# Patient Record
Sex: MALE | Employment: OTHER | URBAN - METROPOLITAN AREA
[De-identification: names, ages, dates, MRNs, and addresses within clinical notes are randomized per-mention and may not be internally consistent; named-entity substitution may affect disease eponyms.]

---

## 2018-12-04 ENCOUNTER — HOSPITAL ENCOUNTER (OUTPATIENT)
Dept: VASCULAR SURGERY | Age: 78
Discharge: HOME | End: 2018-12-04
Payer: MEDICARE

## 2018-12-04 ENCOUNTER — HOSPITAL ENCOUNTER (OUTPATIENT)
Dept: WOUND CARE | Age: 78
Discharge: HOME | End: 2018-12-04
Payer: MEDICARE

## 2018-12-04 VITALS
SYSTOLIC BLOOD PRESSURE: 141 MMHG | RESPIRATION RATE: 18 BRPM | OXYGEN SATURATION: 99 % | DIASTOLIC BLOOD PRESSURE: 67 MMHG | HEART RATE: 103 BPM | TEMPERATURE: 98.3 F

## 2018-12-04 DIAGNOSIS — I87.2 VENOUS INSUFFICIENCY (CHRONIC) (PERIPHERAL): ICD-10-CM

## 2018-12-04 PROBLEM — L30.4 INTERTRIGO: Status: ACTIVE | Noted: 2018-12-04

## 2018-12-04 PROBLEM — L89.42 PRESSURE ULCER OF CONTIGUOUS REGION INVOLVING BACK AND RIGHT BUTTOCK, STAGE 2 (HCC): Status: ACTIVE | Noted: 2018-12-04

## 2018-12-04 PROBLEM — I89.0 LYMPHEDEMA: Status: ACTIVE | Noted: 2018-12-04

## 2018-12-04 PROCEDURE — 29580 STRAPPING UNNA BOOT: CPT

## 2018-12-04 PROCEDURE — 93922 UPR/L XTREMITY ART 2 LEVELS: CPT

## 2018-12-04 RX ORDER — NYSTATIN 100000 [USP'U]/G
POWDER TOPICAL 4 TIMES DAILY
Qty: 60 G | Refills: 2 | Status: SHIPPED | OUTPATIENT
Start: 2018-12-04 | End: 2019-04-08

## 2018-12-04 NOTE — WOUND CARE
12/04/18 1545 Wound Buttocks Right;Medial  
Date First Assessed/Time First Assessed: 12/04/18 1541   POA: Yes  Wound Type: Pressure injury  Location: Buttocks  Orientation: Right;Medial  
DRESSING STATUS Removed Pressure Injury Stage 2 Wound Length (cm) 2 cm Wound Width (cm) 1.8 cm Wound Depth (cm) 0.3 Wound Surface area (cm^2) 3.6 cm^2 Condition of Base Pink; White Condition of Edges Rolled/curled;Calloused Tissue Type Percent Pink 50 Tissue Type Percent Red 40 Tissue Type Percent Other (comment) 10 
(whie) Drainage Amount  Scant Drainage Color Serosanguinous Wound Odor Mild Periwound Skin Condition Erythema, blanchable Cleansing and Cleansing Agents  Dermal wound cleanser (vashe) Dressing Type Applied (adilene,foam offload,mepilex border) Procedure Tolerated Well Wound Leg Lower Right; Lower Date First Assessed/Time First Assessed: 12/04/18 1542   POA: Yes  Wound Type: Moisture Assoc. Skin Damage  Location: Leg Lower  Orientation: Right; Lower DRESSING STATUS Removed DRESSING TYPE Adhesive wound dressing (Band-Aid) Non-Pressure Injury Partial thickness (epider/derm) Wound Length (cm) 1 cm Wound Width (cm) 1 cm Wound Depth (cm) 0.1 Wound Surface area (cm^2) 1 cm^2 Condition of Base Pink Condition of Edges Open Tissue Type Pink Drainage Amount  Small Drainage Color Serous Wound Odor Mild Periwound Skin Condition Erythema, blanchable (dry and scaley) Cleansing and Cleansing Agents  Dermal wound cleanser Dressing Type Applied Unna boot Procedure Tolerated Well

## 2018-12-04 NOTE — WOUND CARE
[facility] Chief Complaint: 
Sebastian Miranda is a 70 y.o.  male who presents with Buttocks R gluteal wound present since November 2018, bilateral lymphedema for more than 10 years. Referred by:  Dr. Jayson Souza HPI:   Patient presents to wound clinic with concerns of bilateral lymphedema that he reports is present for more than 10 years. He was admitted to [facility] from 11/14/2018 to 11/17/2018. He was found to be anemic, was also noted to have systolic murmur and found to have mod to severe aortic stenosis. He has pressure ulcer of right buttock since November of 2018. Wound caused by: chronic pressure Offloading wound: no Appetite: good Wound associated pain: mild Diabetic: yes Past Medical History:  
Diagnosis Date  Anemia  Aortic stenosis  DM (diabetes mellitus) ([facility] [address]  GI bleed  Lymphedema No past surgical history on file. No family history on file. Social History Tobacco Use  Smoking status: Not on file Substance Use Topics  Alcohol use: Not on file Prior to Admission medications Medication Sig Start Date End Date Taking? Authorizing Provider  
nystatin (MYCOSTATIN) powder Apply  to affected area four (4) times daily. 12/4/18  Yes Krissy Starks MD  
 
Allergies not on file Review of Systems Gen: No fever, chills, malaise, weight loss/gain. Heent: No headache, rhinorrhea, epistaxis, ear pain, hearing loss, sinus pain, neck pain/stiffness, sore throat. Heart: No chest pain, palpitations, LOPEZ, pnd, or orthopnea. Resp: No cough, hemoptysis, wheezing and shortness of breath. GI: No nausea, vomiting, diarrhea, constipation, melena or hematochezia. : No urinary obstruction, dysuria or hematuria. Derm: see above Musc/skeletal: no bone or joint complains. Vasc: bilateral lymphedema, chronic venous insufficiency Endo: No heat/cold intolerance, no polyuria,polydipsia or polyphagia. Neuro: No unilateral weakness, numbness, tingling. No seizures. Heme: No easy bruising or bleeding. Objective:  
 
Physical Exam:  
 
Visit Vitals /67 (BP 1 Location: Right arm, BP Patient Position: Sitting) Pulse (!) 103 Temp 98.3 °F (36.8 °C) Resp 18 SpO2 99% General: well developed, well nourished, pleasant , NAD. Hygiene good Psych: cooperative. Pleasant. No anxiety or depression. Normal mood and affect. Neuro: alert and oriented to person/place/situation. Otherwise nonfocal. 
Derm: Skin turgor for age, dry skin HEENT: Normocephalic, atraumatic. EOMI. Conjunctiva clear. No scleral icterus. Neck: Normal range of motion. No masses. Chest: Good air entry bilaterally. Respirations nonlabored Cardio[de-identified] Normal heart sounds,no rubs, murmurs or gallops Abdomen: Soft, nontender, nondistended, normoactive bowel sounds Lower extremities: color normal; temperature normal. Calves are supple, nontender. Capillary refill <3 sec Focussed Lower Extremity Exam: 
Vascular exam: 
Bilateral lower extremity: moderate  edema, foot ,  
DP pulse : Bilateral, 2+ 
PT pulse: Bilateral, 2+ Pressure ulcer right buttock stage 2. Intertrigo abdominal fold Wound Description: Wound Length:   
 
Wound Width :  
 
Wound Depth :  
 
Etiology: pressure Ulcer bed: Fibrotic slough Periwound: Edematous Exudate: Scant/small amount Serous exudate Data Review: No results found for this or any previous visit (from the past 24 hour(s)). Assessment:  
 
Patient Active Problem List  
Diagnosis Code  Lymphedema I89.0  Chronic venous insufficiency I87.2  Pressure ulcer of contiguous region involving back and right buttock, stage 2 L89.42  
 Intertrigo L30.4  
 
70 y.o. male with bilateral lymphedema, pressure ulcer right buttock stage 2. Needs : 
Keep pressure off 
Good local wound care Edema management Good Diabetic control Plan:  
Nystatin powder for intertrigo. In wound care clinic today: unna boots Cleanse wound with NS or soap and water or commercial wound cleanser Apply the following topically to wound bed: adilene Apply the following to elizabeth-wound: NA Apply the following dressings: Absorptive dressing For Home Care/Self Care: 
Cleanse wound with NS or soap and water or commercial wound cleanser Keep dressing dry and intact when bathing Apply the following to wound bed: adilene Apply the following to skin around wound: NA Apply the following dressings: Absorptive dressing Leave dressings in place until next visit Patient to return for wound care in:  14 Days Follow up with Nurse visit as recommended. PLEASE CONTACT OFFICE AS SOON AS POSSIBLE IF UNABLE TO MAKE THIS APPOINTMENT. Inspect your wounds, looking for signs of infection which may include the following:  Increase in redness  Red \"streaks\" from wound  Increase in swelling  Fever  Unusual odor  Change in the amount of wound drainage. Should you experience any significant changes in your wound(s) or have any questions regarding your home care instructions please contact the wound center or your home health company. If after regular business hours, please call your family doctor or local emergency room. Edema Control:   Elevate legs as much as possible. Avoid standing in one position for more than 10 minutes. Avoid setting with legs down. Do not cross legs while sitting. Off-Loading:     Frequent position changes. Do not cross legs while sitting. Shift weight every 20 minutes or more when sitting for prolonged periods of time. Signed By: Ave Schaeffer MD   
 December 4, 2018

## 2018-12-06 LAB
LEFT ABI: 1.09
LEFT ANTERIOR TIBIAL: 141 MMHG
LEFT ARM BP: 132 MMHG
LEFT POSTERIOR TIBIAL: 154 MMHG
LEFT TBI: 0.7
LEFT TOE PRESSURE: 99 MMHG
RIGHT ABI: 1.01
RIGHT ANTERIOR TIBIAL: 131 MMHG
RIGHT ARM BP: 141 MMHG
RIGHT POSTERIOR TIBIAL: 142 MMHG
RIGHT TBI: 0.72
RIGHT TOE PRESSURE: 101 MMHG

## 2018-12-18 ENCOUNTER — HOSPITAL ENCOUNTER (OUTPATIENT)
Dept: WOUND CARE | Age: 78
Discharge: HOME | End: 2018-12-18
Payer: MEDICARE

## 2018-12-18 VITALS
HEART RATE: 100 BPM | DIASTOLIC BLOOD PRESSURE: 61 MMHG | TEMPERATURE: 98 F | SYSTOLIC BLOOD PRESSURE: 141 MMHG | RESPIRATION RATE: 19 BRPM | OXYGEN SATURATION: 98 %

## 2018-12-18 PROCEDURE — 99214 OFFICE O/P EST MOD 30 MIN: CPT

## 2018-12-18 NOTE — WOUND CARE
12/18/18 1453   Wound Buttocks Right;Medial   Date First Assessed/Time First Assessed: 12/04/18 1541   POA: Yes  Wound Type: Pressure injury  Location: Buttocks  Orientation: Right;Medial   DRESSING STATUS Intact   DRESSING TYPE (mepilex border)   Pressure Injury Stage 2   Wound Length (cm) 0.5 cm   Wound Width (cm) 1 cm   Wound Depth (cm) 0.2   Wound Surface area (cm^2) 0.5 cm^2   Change in Wound Size % 86.11   Condition of Base Pink; White   Condition of Edges Calloused   Tissue Type Percent Pink 60   Tissue Type Percent Yellow 40   Drainage Amount  Small    Drainage Color Clear   Periwound Skin Condition Erythema, blanchable   Cleansing and Cleansing Agents  (foam wash, barrier wipes)   Dressing Type Applied (foam offload, mepilex border)   Procedure Tolerated Well   Wound Leg Lower Right; Lower   Date First Assessed/Time First Assessed: 12/04/18 1542   POA: Yes  Wound Type: Moisture Assoc. Skin Damage  Location: Leg Lower  Orientation: Right; Lower   DRESSING STATUS Intact   DRESSING TYPE Unna boot   Wound Length (cm) (wound clinically closed)   Epithelialization (%) 100   Tissue Type Pink   Drainage Amount  None

## 2018-12-19 NOTE — WOUND CARE
[address] [facility] ER  Follow up note. Chief Complaint:  Mckenzie Galarza is a 70 y.o.  male who presents for follow up of right buttock pressure ulcer and bilateral lymphedema. Right buttock pressure ulcer almost healed. Review of systems  General: No fevers or chills. Cardiovascular: No chest pain or pressure. No palpitations. Pulmonary: No shortness of breath. Gastrointestinal: No nausea, vomiting. Derm: See above                Physical Exam:     Visit Vitals  /61 (BP 1 Location: Left arm, BP Patient Position: Sitting)   Pulse 100   Temp 98 °F (36.7 °C)   Resp 19   SpO2 98%     General: well developed, well nourished, pleasant , NAD. Hygiene good  Psych: cooperative. Pleasant. No anxiety or depression. Normal mood and affect. Neuro: alert and oriented to person/place/situation. Otherwise nonfocal.  Derm: Skin turgor normal for age, dry skin  HEENT: Normocephalic, atraumatic. EOMI. Conjunctiva clear. No scleral icterus. Chest: Good air entry bilaterally. Respirations non labored  Cardio[de-identified] Normal heart sounds,no rubs, murmurs or gallops  Abdomen: Soft, nontender, nondistended, normoactive bowel sounds  Lower extremities: color normal; temperature normal. Calves are supple, nontender. Capillary refill <3 sec  Focussed Lower Extremity Exam:  Vascular exam:  Bilateral lower extremity: moderate  edema, foot ,   DP pulse : Bilateral, 2+  PT pulse: Bilateral, 2+  Pressure ulcer right buttock stage 2. Wound Description:   Wound Length: 0.5 cm, (wound clinically closed)    Wound Width :1 cm    Wound Depth :0.2        Data Review:   No results found for this or any previous visit (from the past 24 hour(s)).     Assessment:     Patient Active Problem List   Diagnosis Code    Lymphedema I89.0    Chronic venous insufficiency I87.2    Pressure ulcer of contiguous region involving back and right buttock, stage 2 L89.42    Intertrigo L30.4     70 y.o. male with pressure ulcer stage 2 now healed. Needs :    Edema management  l  Plan:     Tubigrip. Wear compression stockings at home  Follow up as needed. PLEASE CONTACT OFFICE AS SOON AS POSSIBLE IF UNABLE TO MAKE THIS APPOINTMENT. Inspect your wounds, looking for signs of infection which may include the following:  Increase in redness  Red \"streaks\" from wound  Increase in swelling  Fever  Unusual odor  Change in the amount of wound drainage. Should you experience any significant changes in your wound(s) or have any questions regarding your home care instructions please contact the wound center or your home health company. If after regular business hours, please call your family doctor or local emergency room. Edema Control:   Elevate legs as much as possible. Avoid standing in one position for more than 10 minutes. Avoid setting with legs down. Do not cross legs while sitting. Off-Loading:     Frequent position changes. Do not cross legs while sitting. Shift weight every 20 minutes or more when sitting for prolonged periods of time.     Signed By: Cintyha Morel MD     December 19, 2018

## 2019-01-04 ENCOUNTER — HOSPITAL ENCOUNTER (EMERGENCY)
Age: 79
Discharge: HOME | End: 2019-01-04
Attending: EMERGENCY MEDICINE
Payer: MEDICARE

## 2019-01-04 VITALS
SYSTOLIC BLOOD PRESSURE: 158 MMHG | DIASTOLIC BLOOD PRESSURE: 62 MMHG | TEMPERATURE: 98.4 F | HEART RATE: 94 BPM | WEIGHT: 180 LBS | RESPIRATION RATE: 18 BRPM | HEIGHT: 73 IN | BODY MASS INDEX: 23.86 KG/M2 | OXYGEN SATURATION: 99 %

## 2019-01-04 DIAGNOSIS — D64.9 ANEMIA, UNSPECIFIED TYPE: ICD-10-CM

## 2019-01-04 DIAGNOSIS — L03.116 CELLULITIS OF LEFT LOWER EXTREMITY: Primary | ICD-10-CM

## 2019-01-04 LAB
ALBUMIN SERPL-MCNC: 2.7 G/DL (ref 3.4–5)
ALBUMIN/GLOB SERPL: 0.6 {RATIO} (ref 0.8–1.7)
ALP SERPL-CCNC: 127 U/L (ref 45–117)
ALT SERPL-CCNC: 17 U/L (ref 16–61)
ANION GAP SERPL CALC-SCNC: 6 MMOL/L (ref 3–18)
APPEARANCE UR: CLEAR
AST SERPL-CCNC: 15 U/L (ref 15–37)
BACTERIA URNS QL MICRO: ABNORMAL /HPF
BASOPHILS # BLD: 0 K/UL (ref 0–0.1)
BASOPHILS NFR BLD: 0 % (ref 0–2)
BILIRUB SERPL-MCNC: 0.2 MG/DL (ref 0.2–1)
BILIRUB UR QL: NEGATIVE
BUN SERPL-MCNC: 22 MG/DL (ref 7–18)
BUN/CREAT SERPL: 20
CALCIUM SERPL-MCNC: 8.4 MG/DL (ref 8.5–10.1)
CHLORIDE SERPL-SCNC: 105 MMOL/L (ref 100–108)
CO2 SERPL-SCNC: 27 MMOL/L (ref 21–32)
COLOR UR: YELLOW
CREAT SERPL-MCNC: 1.11 MG/DL (ref 0.6–1.3)
DIFFERENTIAL METHOD BLD: ABNORMAL
EOSINOPHIL # BLD: 0.2 K/UL (ref 0–0.4)
EOSINOPHIL NFR BLD: 1 % (ref 0–5)
EPITH CASTS URNS QL MICRO: ABNORMAL /LPF (ref 0–5)
ERYTHROCYTE [DISTWIDTH] IN BLOOD BY AUTOMATED COUNT: 22.6 % (ref 11.6–14.5)
GLOBULIN SER CALC-MCNC: 4.4 G/DL (ref 2–4)
GLUCOSE SERPL-MCNC: 152 MG/DL (ref 74–99)
GLUCOSE UR STRIP.AUTO-MCNC: NEGATIVE MG/DL
HCT VFR BLD AUTO: 30.4 % (ref 36–48)
HGB BLD-MCNC: 9.5 G/DL (ref 13–16)
HGB UR QL STRIP: NEGATIVE
KETONES UR QL STRIP.AUTO: NEGATIVE MG/DL
LACTATE BLD-SCNC: 1.61 MMOL/L (ref 0.4–2)
LEUKOCYTE ESTERASE UR QL STRIP.AUTO: ABNORMAL
LYMPHOCYTES # BLD: 1.3 K/UL (ref 0.9–3.6)
LYMPHOCYTES NFR BLD: 12 % (ref 21–52)
MCH RBC QN AUTO: 22.6 PG (ref 24–34)
MCHC RBC AUTO-ENTMCNC: 31.3 G/DL (ref 31–37)
MCV RBC AUTO: 72.2 FL (ref 74–97)
MONOCYTES # BLD: 0.9 K/UL (ref 0.05–1.2)
MONOCYTES NFR BLD: 8 % (ref 3–10)
NEUTS SEG # BLD: 8.8 K/UL (ref 1.8–8)
NEUTS SEG NFR BLD: 79 % (ref 40–73)
NITRITE UR QL STRIP.AUTO: NEGATIVE
PH UR STRIP: 5 [PH] (ref 5–8)
PLATELET # BLD AUTO: 414 K/UL (ref 135–420)
PMV BLD AUTO: 8.9 FL (ref 9.2–11.8)
POTASSIUM SERPL-SCNC: 4.5 MMOL/L (ref 3.5–5.5)
PROT SERPL-MCNC: 7.1 G/DL (ref 6.4–8.2)
PROT UR STRIP-MCNC: 100 MG/DL
RBC # BLD AUTO: 4.21 M/UL (ref 4.7–5.5)
RBC #/AREA URNS HPF: ABNORMAL /HPF (ref 0–5)
SODIUM SERPL-SCNC: 138 MMOL/L (ref 136–145)
SP GR UR REFRACTOMETRY: 1.02 (ref 1–1.03)
UROBILINOGEN UR QL STRIP.AUTO: 1 EU/DL (ref 0.2–1)
WBC # BLD AUTO: 11.1 K/UL (ref 4.6–13.2)
WBC URNS QL MICRO: ABNORMAL /HPF (ref 0–5)

## 2019-01-04 PROCEDURE — 74011250636 HC RX REV CODE- 250/636: Performed by: NURSE PRACTITIONER

## 2019-01-04 PROCEDURE — 81001 URINALYSIS AUTO W/SCOPE: CPT

## 2019-01-04 PROCEDURE — 87070 CULTURE OTHR SPECIMN AEROBIC: CPT

## 2019-01-04 PROCEDURE — 87077 CULTURE AEROBIC IDENTIFY: CPT

## 2019-01-04 PROCEDURE — 96361 HYDRATE IV INFUSION ADD-ON: CPT

## 2019-01-04 PROCEDURE — 83605 ASSAY OF LACTIC ACID: CPT

## 2019-01-04 PROCEDURE — 87040 BLOOD CULTURE FOR BACTERIA: CPT

## 2019-01-04 PROCEDURE — 85025 COMPLETE CBC W/AUTO DIFF WBC: CPT

## 2019-01-04 PROCEDURE — 80053 COMPREHEN METABOLIC PANEL: CPT

## 2019-01-04 PROCEDURE — 96365 THER/PROPH/DIAG IV INF INIT: CPT

## 2019-01-04 PROCEDURE — 99283 EMERGENCY DEPT VISIT LOW MDM: CPT

## 2019-01-04 PROCEDURE — 87186 SC STD MICRODIL/AGAR DIL: CPT

## 2019-01-04 RX ORDER — CLINDAMYCIN PHOSPHATE 900 MG/50ML
900 INJECTION, SOLUTION INTRAVENOUS
Status: COMPLETED | OUTPATIENT
Start: 2019-01-04 | End: 2019-01-04

## 2019-01-04 RX ORDER — CLINDAMYCIN HYDROCHLORIDE 300 MG/1
300 CAPSULE ORAL 4 TIMES DAILY
Qty: 28 CAP | Refills: 0 | Status: SHIPPED | OUTPATIENT
Start: 2019-01-04 | End: 2019-01-11

## 2019-01-04 RX ORDER — SODIUM CHLORIDE 9 MG/ML
500 INJECTION, SOLUTION INTRAVENOUS ONCE
Status: COMPLETED | OUTPATIENT
Start: 2019-01-04 | End: 2019-01-04

## 2019-01-04 RX ADMIN — SODIUM CHLORIDE 500 ML: 900 INJECTION, SOLUTION INTRAVENOUS at 14:38

## 2019-01-04 RX ADMIN — CLINDAMYCIN PHOSPHATE 900 MG: 900 INJECTION, SOLUTION INTRAVENOUS at 14:38

## 2019-01-04 NOTE — ED TRIAGE NOTES
Here for dressing change to left lower leg, states he went to THE [facility] wound care clinic but they were too busy to see him today. Drainage and odor noticed from left lower leg.

## 2019-01-04 NOTE — DISCHARGE INSTRUCTIONS
Follow up as directed  Return to the ED for increased redness, swelling, drainage, fever or worsening of symptoms  Take medications as directed  Evleyn Gonzales were found to be anemic, you need to have your blood counts checked by your PCP     Patient Education        Cellulitis: Care Instructions  Your Care Instructions    Cellulitis is a skin infection caused by bacteria, most often strep or staph. It often occurs after a break in the skin from a scrape, cut, bite, or puncture, or after a rash. Cellulitis may be treated without doing tests to find out what caused it. But your doctor may do tests, if needed, to look for a specific bacteria, like methicillin-resistant Staphylococcus aureus (MRSA). The doctor has checked you carefully, but problems can develop later. If you notice any problems or new symptoms, get medical treatment right away. Follow-up care is a key part of your treatment and safety. Be sure to make and go to all appointments, and call your doctor if you are having problems. It's also a good idea to know your test results and keep a list of the medicines you take. How can you care for yourself at home? · Take your antibiotics as directed. Do not stop taking them just because you feel better. You need to take the full course of antibiotics. · Prop up the infected area on pillows to reduce pain and swelling. Try to keep the area above the level of your heart as often as you can. · If your doctor told you how to care for your wound, follow your doctor's instructions. If you did not get instructions, follow this general advice:  ? Wash the wound with clean water 2 times a day. Don't use hydrogen peroxide or alcohol, which can slow healing. ? You may cover the wound with a thin layer of petroleum jelly, such as Vaseline, and a nonstick bandage. ? Apply more petroleum jelly and replace the bandage as needed. · Be safe with medicines. Take pain medicines exactly as directed.   ? If the doctor gave you a prescription medicine for pain, take it as prescribed. ? If you are not taking a prescription pain medicine, ask your doctor if you can take an over-the-counter medicine. To prevent cellulitis in the future  · Try to prevent cuts, scrapes, or other injuries to your skin. Cellulitis most often occurs where there is a break in the skin. · If you get a scrape, cut, mild burn, or bite, wash the wound with clean water as soon as you can to help avoid infection. Don't use hydrogen peroxide or alcohol, which can slow healing. · If you have swelling in your legs (edema), support stockings and good skin care may help prevent leg sores and cellulitis. · Take care of your feet, especially if you have diabetes or other conditions that increase the risk of infection. Wear shoes and socks. Do not go barefoot. If you have athlete's foot or other skin problems on your feet, talk to your doctor about how to treat them. When should you call for help? Call your doctor now or seek immediate medical care if:    · You have signs that your infection is getting worse, such as:  ? Increased pain, swelling, warmth, or redness. ? Red streaks leading from the area. ? Pus draining from the area. ? A fever.     · You get a rash.    Watch closely for changes in your health, and be sure to contact your doctor if:    · You do not get better as expected. Where can you learn more? Go to http://miguel-sugey.info/. Charlie Bowers in the search box to learn more about \"Cellulitis: Care Instructions. \"  Current as of: April 18, 2018  Content Version: 11.8  © 5832-7545 [employer]. Care instructions adapted under license by [employer] (which disclaims liability or warranty for this information). If you have questions about a medical condition or this instruction, always ask your healthcare professional. [address] any warranty or liability for your use of this information.

## 2019-01-04 NOTE — ED PROVIDER NOTES
EMERGENCY DEPARTMENT HISTORY AND PHYSICAL EXAM 
 
Date: 1/4/2019 Patient Name: Martir Pro History of Presenting Illness Chief Complaint Patient presents with  Wound Check History Provided By: Patient Chief Complaint: leg swelling with weeping wounds Duration: 2 Days Timing:  Worsening Location: bilateral lower legs Severity: Severe Associated Symptoms: denies any other associated signs or symptoms Additional History (Context):  
 
2:07 PM 
 
Denis Merida III is a 70 y.o. male with pertinent PMHx of DM, anemia, and lymphedema presenting ambulatory to the ED c/o worsening severe bilateral lower leg swelling with weeping wounds x 2 days. Pt states that he has been treated for similar issues in the past with Wound Care. Last PCP visit was ~1 month ago. Pt specifically denies any leg pain, numbness, fever/chills or N/V/D. 
 
PCP: Tion Oliveira MD 
Pt does not smoke tobacco, drink ETOH or do illicit drugs. There are no other complaints, changes, or physical findings at this time. Current Outpatient Medications Medication Sig Dispense Refill  clindamycin (CLEOCIN) 300 mg capsule Take 1 Cap by mouth four (4) times daily for 7 days. 28 Cap 0  
 nystatin (MYCOSTATIN) powder Apply  to affected area four (4) times daily. 60 g 2 Past History Past Medical History: 
Past Medical History:  
Diagnosis Date  Anemia  Aortic stenosis  At risk for falls  DM (diabetes mellitus) ([address]  GI bleed  Lymphedema Past Surgical History: No past surgical history on file. Family History: No family history on file. Social History: 
Social History Tobacco Use  Smoking status: Not on file Substance Use Topics  Alcohol use: Not on file  Drug use: Not on file Allergies: Allergies Allergen Reactions  Pcn [Penicillins] Hives, Shortness of Breath and Itching Review of Systems Review of Systems Constitutional: Negative for chills and fever. Cardiovascular: Positive for leg swelling (bilateral). Gastrointestinal: Negative for abdominal pain, diarrhea, nausea and vomiting. Musculoskeletal: Negative for myalgias. Skin: Positive for wound (weeping bilateral lower legs). Neurological: Negative for numbness. All other systems reviewed and are negative. Physical Exam  
 
Vitals:  
 01/04/19 1244 01/04/19 1539 01/04/19 1612 BP: 154/56 158/62 Pulse: (!) 103 94 Resp: 18 18 Temp: 99.1 °F (37.3 °C)  98.4 °F (36.9 °C) SpO2: 99% 99% Weight: 81.6 kg (180 lb) Height: 6' 1\" (1.854 m) Physical Exam  
Constitutional: He is oriented to person, place, and time. He appears well-developed and well-nourished. chronically ill appearing, NAD HENT:  
Head: Normocephalic and atraumatic. Eyes: Conjunctivae are normal.  
Neck: Normal range of motion. Neck supple. Cardiovascular: Regular rhythm. Murmur heard. Mild Tachycardia Pulmonary/Chest: Effort normal.  
Decreased b/l bases Abdominal: Soft. Bowel sounds are normal. There is no tenderness. There is no rebound and no guarding. Musculoskeletal: Normal range of motion. Legs: 
+2 b/l lower extremity edema, positive pedal pulses b/l, flaking of the lower extremities b/l, erythema and moderate serosanguinous fluid from left lower extremity, warmth noted, no TTP Neurological: He is alert and oriented to person, place, and time. Skin: Skin is warm and dry. Nursing note and vitals reviewed. Diagnostic Study Results Labs - Recent Results (from the past 12 hour(s)) CBC WITH AUTOMATED DIFF Collection Time: 01/04/19  1:55 PM  
Result Value Ref Range WBC 11.1 4.6 - 13.2 K/uL  
 RBC 4.21 (L) 4.70 - 5.50 M/uL HGB 9.5 (L) 13.0 - 16.0 g/dL HCT 30.4 (L) 36.0 - 48.0 % MCV 72.2 (L) 74.0 - 97.0 FL  
 MCH 22.6 (L) 24.0 - 34.0 PG  
 MCHC 31.3 31.0 - 37.0 g/dL RDW 22.6 (H) 11.6 - 14.5 % PLATELET 968 841 - 381 K/uL MPV 8.9 (L) 9.2 - 11.8 FL  
 NEUTROPHILS 79 (H) 40 - 73 % LYMPHOCYTES 12 (L) 21 - 52 % MONOCYTES 8 3 - 10 % EOSINOPHILS 1 0 - 5 % BASOPHILS 0 0 - 2 %  
 ABS. NEUTROPHILS 8.8 (H) 1.8 - 8.0 K/UL  
 ABS. LYMPHOCYTES 1.3 0.9 - 3.6 K/UL  
 ABS. MONOCYTES 0.9 0.05 - 1.2 K/UL  
 ABS. EOSINOPHILS 0.2 0.0 - 0.4 K/UL  
 ABS. BASOPHILS 0.0 0.0 - 0.1 K/UL  
 DF AUTOMATED METABOLIC PANEL, COMPREHENSIVE Collection Time: 01/04/19  1:55 PM  
Result Value Ref Range Sodium 138 136 - 145 mmol/L Potassium 4.5 3.5 - 5.5 mmol/L Chloride 105 100 - 108 mmol/L  
 CO2 27 21 - 32 mmol/L Anion gap 6 3.0 - 18 mmol/L Glucose 152 (H) 74 - 99 mg/dL BUN 22 (H) 7.0 - 18 MG/DL Creatinine 1.11 0.6 - 1.3 MG/DL  
 BUN/Creatinine ratio 20 GFR est AA >60 >60 ml/min/1.73m2 GFR est non-AA >60 >60 ml/min/1.73m2 Calcium 8.4 (L) 8.5 - 10.1 MG/DL Bilirubin, total 0.2 0.2 - 1.0 MG/DL  
 ALT (SGPT) 17 16 - 61 U/L  
 AST (SGOT) 15 15 - 37 U/L Alk. phosphatase 127 (H) 45 - 117 U/L Protein, total 7.1 6.4 - 8.2 g/dL Albumin 2.7 (L) 3.4 - 5.0 g/dL Globulin 4.4 (H) 2.0 - 4.0 g/dL A-G Ratio 0.6 (L) 0.8 - 1.7 POC LACTIC ACID Collection Time: 01/04/19  2:01 PM  
Result Value Ref Range Lactic Acid (POC) 1.61 0.40 - 2.00 mmol/L  
URINALYSIS W/ RFLX MICROSCOPIC Collection Time: 01/04/19  3:45 PM  
Result Value Ref Range Color YELLOW Appearance CLEAR Specific gravity 1.016 1.005 - 1.030    
 pH (UA) 5.0 5.0 - 8.0 Protein 100 (A) NEG mg/dL Glucose NEGATIVE  NEG mg/dL Ketone NEGATIVE  NEG mg/dL Bilirubin NEGATIVE  NEG Blood NEGATIVE  NEG Urobilinogen 1.0 0.2 - 1.0 EU/dL Nitrites NEGATIVE  NEG Leukocyte Esterase SMALL (A) NEG URINE MICROSCOPIC ONLY Collection Time: 01/04/19  3:45 PM  
Result Value Ref Range WBC 30 to 40 0 - 5 /hpf  
 RBC 2 to 3 0 - 5 /hpf Epithelial cells 2+ 0 - 5 /lpf Bacteria 2+ (A) NEG /hpf Radiologic Studies - No orders to display Medical Decision Making I am the first provider for this patient. I reviewed the vital signs, available nursing notes, past medical history, past surgical history, family history and social history. Vital Signs-Reviewed the patient's vital signs. Pulse Oximetry Analysis - 99% on RA Records Reviewed: Nursing Notes Provider Notes (Medical Decision Making): Patient presents to the ED for leg weeping and being unable to be seen at the wound clinic. Leg exam is weeping and consistent with cellulitis. Labs shows no signs of severe infection. His legs were dressed by wound clinic. He was evaluated by attending who believes he is appropriate for outpatient treatment. Will place on Clindamycin to follow up with wound clinic on Monday. He understands reasons to return and is offering no questions or concerns PROCEDURES: 
Procedures MEDICATIONS GIVEN IN THE ED: 
Medications  
0.9% sodium chloride infusion 500 mL (0 mL IntraVENous IV Completed 1/4/19 1539) clindamycin (CLEOCIN) 900mg NS 50mL IVPB (premix) (900 mg IntraVENous Given 1/4/19 1438) ED COURSE:  
2:07 PM  
Initial assessment performed. FACE-TO-FACE PROGRESS NOTE: 
4:28 PM 
NP asked to see patient who presents with venostasis with questionable secondary infection. My exam shows no fever and no white count Pt is standing at the door ready to go home and he says he is fine Imp/plan: I agree with discharge and PO of abx. I have personally seen and examined the patient, reviewed the EZEKIEL's note and agree with findings and plan. Written by Cozette Dance, ED Scribe, as dictated by Sara Oliveira MD. Diagnosis and Disposition DISCHARGE NOTE: 
4:37 PM 
The patient is ready for discharge.  The patient's signs, symptoms, diagnosis, and discharge instructions have been discussed and the patient and/or family has conveyed their understanding. The patient and/or family is to follow up as recommended or return to the ER should their symptoms worsen. Plan has been discussed and the patient and/or family is in agreement. Written by Maritza Cervantes ED Scribe, as dictated by Montse Martini FNP-BC. CLINICAL IMPRESSION: 
1. Cellulitis of left lower extremity 2. Anemia, unspecified type PLAN: 
1. D/C Home 2. Discharge Medication List as of 1/4/2019  4:37 PM  
  
START taking these medications Details  
clindamycin (CLEOCIN) 300 mg capsule Take 1 Cap by mouth four (4) times daily for 7 days. , Normal, Disp-28 Cap, R-0  
  
  
CONTINUE these medications which have NOT CHANGED Details  
nystatin (MYCOSTATIN) powder Apply  to affected area four (4) times daily. , Normal, Disp-60 g, R-2 3. Follow-up Information Follow up With Specialties Details Why Contact Info Additional Information [facility] OP WOUND CARE Wound Care Schedule an appointment as soon as possible for a visit for wound care [address] [phone] Patients scheduled for OP Wound Care visits should enter the [facility], 2nd floor, [address] for check in. Desiree Dash MD Family Practice Schedule an appointment as soon as possible for a visit for primary care follow up [address] [place] [address] 
[phone] [facility] EMERGENCY DEPT Emergency Medicine  As needed, If symptoms worsen [address] [phone]   
  
 
_______________________________ Attestations: This note is prepared by Jacqueline Ray, acting as Scribe for Montse Martini FNP-BC. Montse Martini FNP-BC:  The scribe's documentation has been prepared under my direction and personally reviewed by me in its entirety. I confirm that the note above accurately reflects all work, treatment, procedures, and medical decision making performed by me. ATTESTATIONS: 
This note is prepared by Lexa Anglin, acting as Scribe for [employer], MD. [employer], MD: The scribe's documentation has been prepared under my direction and personally reviewed by me in its entirety. I confirm that the note above accurately reflects all work, treatment, procedures, and medical decision making performed by me.   
_______________________________

## 2019-01-07 ENCOUNTER — HOSPITAL ENCOUNTER (OUTPATIENT)
Dept: WOUND CARE | Age: 79
Discharge: HOME | End: 2019-01-07
Payer: MEDICARE

## 2019-01-07 VITALS
TEMPERATURE: 97.3 F | RESPIRATION RATE: 18 BRPM | HEART RATE: 67 BPM | SYSTOLIC BLOOD PRESSURE: 134 MMHG | DIASTOLIC BLOOD PRESSURE: 60 MMHG | OXYGEN SATURATION: 100 %

## 2019-01-07 LAB
BACTERIA SPEC CULT: ABNORMAL
BACTERIA SPEC CULT: ABNORMAL
GRAM STN SPEC: ABNORMAL
GRAM STN SPEC: ABNORMAL
SERVICE CMNT-IMP: ABNORMAL

## 2019-01-07 PROCEDURE — 29580 STRAPPING UNNA BOOT: CPT

## 2019-01-07 NOTE — WOUND CARE
01/07/19 1548 Wound Leg Lower Medial  
Date First Assessed/Time First Assessed: 01/07/19 1330   POA: Yes  Wound Type: Blister/bullae  Location: Leg Lower  Orientation: Medial  
DRESSING STATUS Clean, dry, and intact DRESSING TYPE (ace wraps, kerlex) Non-Pressure Injury Partial thickness (epider/derm) Wound Length (cm) 3 cm Wound Width (cm) 6.2 cm Wound Depth (cm) 0.1 Wound Surface area (cm^2) 18.6 cm^2 Condition of Base Pink Condition of Edges Open Tissue Type Percent Pink 100 Drainage Amount  Small Drainage Color Serous Wound Odor None Periwound Skin Condition Intact Cleansing and Cleansing Agents  (foam wash, vashe and barrier wipes) Dressing Type Applied (aquacel ag, exudry, soft roll and unna boot) Procedure Tolerated Well Wound Leg Right;Lateral  
Date First Assessed/Time First Assessed: 01/07/19 1330   POA: Yes  Wound Type: Blister/bullae  Location: Leg  Orientation: Right;Lateral  
DRESSING STATUS Clean, dry, and intact DRESSING TYPE (ace wrap and kerlex) Non-Pressure Injury Partial thickness (epider/derm) Wound Length (cm) (multiple open areas in an area that measures 5 x 4.2 x 0.1) Condition of Base Pink Condition of Edges Open Tissue Type Percent Pink 100 Drainage Amount  Small Drainage Color Serous Wound Odor None Cleansing and Cleansing Agents  (foam wash, vashe and barrier wipes) Dressing Type Applied (aquacel ag, exudry, soft roll, unna boot) Procedure Tolerated Well

## 2019-01-10 LAB
BACTERIA SPEC CULT: NORMAL
BACTERIA SPEC CULT: NORMAL
SERVICE CMNT-IMP: NORMAL
SERVICE CMNT-IMP: NORMAL

## 2019-01-15 ENCOUNTER — HOSPITAL ENCOUNTER (OUTPATIENT)
Dept: WOUND CARE | Age: 79
Discharge: HOME | End: 2019-01-15
Payer: MEDICARE

## 2019-01-15 VITALS
DIASTOLIC BLOOD PRESSURE: 75 MMHG | TEMPERATURE: 98.1 F | RESPIRATION RATE: 21 BRPM | OXYGEN SATURATION: 99 % | HEART RATE: 100 BPM | SYSTOLIC BLOOD PRESSURE: 155 MMHG

## 2019-01-15 PROBLEM — I83.029 VENOUS ULCER OF LEFT LEG (HCC): Status: ACTIVE | Noted: 2019-01-15

## 2019-01-15 PROBLEM — L97.929 VENOUS ULCER OF LEFT LEG (HCC): Status: ACTIVE | Noted: 2019-01-15

## 2019-01-15 PROCEDURE — 99215 OFFICE O/P EST HI 40 MIN: CPT

## 2019-01-15 NOTE — WOUND CARE
[facility] Follow up note. Chief Complaint: 
Jacob Obregon is a 70 y.o.  male who presents for follow up of follow up of right buttock pressure ulcer and bilateral lymphedema.  
  
Right buttock pressure ulcer healed. he has lymphedema for about 5 years. He has on and off venous ulcers. He has left leg venous ulcers. He reports his PCP started him on antibiotic daily and he is suppose to take it for long time. He is poor historian and does not remember why he is started on antibiotic and which antibiotic. Review of systems General: No fevers or chills. Cardiovascular: No chest pain or pressure. No palpitations. Pulmonary: No shortness of breath. Gastrointestinal: No nausea, vomiting. Derm: See above Physical Exam:  
 
Visit Vitals /75 (BP 1 Location: Left arm, BP Patient Position: Sitting) Pulse 100 Temp 98.1 °F (36.7 °C) Resp 21 SpO2 99% General: well developed, well nourished, pleasant , NAD. Hygiene good Psych: cooperative. Pleasant. No anxiety or depression. Normal mood and affect. Neuro: alert and oriented to person/place/situation. Otherwise nonfocal. 
Derm: Skin turgor normal for age, dry skin HEENT: Normocephalic, atraumatic. EOMI. Conjunctiva clear. No scleral icterus. Chest: Good air entry bilaterally. Respirations non labored Cardio[de-identified] Normal heart sounds,no rubs, murmurs or gallops Abdomen: Soft, nontender, nondistended, normoactive bowel sounds Lower extremities: color normal; temperature normal. Calves are supple, nontender. Focussed Lower Extremity Exam: 
Vascular exam: 
Bilateral lower extremity: moderate  edema, foot ,  
DP pulse : Bilateral, 2+ 
PT pulse: Bilateral, 2+ Venous ulcer left leg. And small ulcer right leg Wound Description: Wound Length: 2 cm Wound Width :5.5 cm Wound Depth :0.1 Etiology: venous stasis Ulcer bed: Mixed Granular/Fibrotic Periwound: Reddened Exudate: Scant/small amount Serosanguinous exudate Data Review: No results found for this or any previous visit (from the past 24 hour(s)). Assessment:  
 
Patient Active Problem List  
Diagnosis Code  Lymphedema I89.0  Chronic venous insufficiency I87.2  Pressure ulcer of contiguous region involving back and right buttock, stage 2 L89.42  
 Intertrigo L30.4  Venous ulcer of left leg ([facility]) I83.029, L97.929  
 
70 y.o. male with bilateral venous ulcer of left leg and small ulcer right leg Pressure ulcer sacral area healed. Needs : 
Serial debridement- debrided today- see note below Good local wound care Edema management Nutrition optimization Good Diabetic control Plan:  
Unna boots In wound care clinic today: 
Cleanse wound with NS or soap and water or commercial wound cleanser Apply the following topically to wound bed: adilene Apply the following to elizabeth-wound: NA Apply the following dressings: Absorptive dressing For Home Care/Self Care: 
Cleanse wound with NS or soap and water or commercial wound cleanser Keep dressing dry and intact when bathing Apply the following to wound bed: adilene Apply the following to skin around wound: NA Apply the following dressings: Absorptive dressing Patient to return for wound care in: 14  Days Follow up with Nurse visit as recommended. PLEASE CONTACT OFFICE AS SOON AS POSSIBLE IF UNABLE TO MAKE THIS APPOINTMENT. Inspect your wounds, looking for signs of infection which may include the following:  Increase in redness  Red \"streaks\" from wound  Increase in swelling  Fever  Unusual odor  Change in the amount of wound drainage. Should you experience any significant changes in your wound(s) or have any questions regarding your home care instructions please contact the wound center or your home health company. If after regular business hours, please call your family doctor or local emergency room. Edema Control:   Elevate legs as much as possible. Avoid standing in one position for more than 10 minutes. Avoid setting with legs down. Do not cross legs while sitting. Off-Loading:     Frequent position changes. Do not cross legs while sitting. Shift weight every 20 minutes or more when sitting for prolonged periods of time. Signed By: Houston Corcoran MD   
 January 15, 2019

## 2019-01-15 NOTE — WOUND CARE
01/15/19 1534 Wound Leg Lower Medial  
Date First Assessed/Time First Assessed: 01/07/19 1330   POA: Yes  Wound Type: Blister/bullae  Location: Leg Lower  Orientation: Medial  
DRESSING STATUS Clean, dry, and intact Non-Pressure Injury Full thickness (subcut/muscle) Wound Length (cm) 2 cm Wound Width (cm) 5.5 cm Wound Depth (cm) 0.1 Wound Surface area (cm^2) 11 cm^2 Change in Wound Size % 40.86 Condition of Base Pink Condition of Edges Open Tissue Type Percent Pink 100 Procedure Tolerated Well Wound Leg Lower Right; Lower Date First Assessed/Time First Assessed: 12/04/18 1542   POA: Yes  Wound Type: Moisture Assoc. Skin Damage  Location: Leg Lower  Orientation: Right; Lower DRESSING STATUS Clean, dry, and intact DRESSING TYPE Absorptive; Compression dressing Non-Pressure Injury Partial thickness (epider/derm) Wound Length (cm) 1 cm Wound Width (cm) 3 cm Wound Depth (cm) 0.1 Wound Surface area (cm^2) 3 cm^2 Change in Wound Size % -200 Condition of Edges Open Tissue Type Percent Pink (100) Drainage Amount  Scant Drainage Color Clear Periwound Skin Condition Erythema, blanchable Cleansing and Cleansing Agents  Dermal wound cleanser Dressing Type Applied Unna boot 
(coban) Procedure Tolerated Well Wound Leg Right;Lateral  
Date First Assessed/Time First Assessed: 01/07/19 1330   POA: Yes  Wound Type: Blister/bullae  Location: Leg  Orientation: Right;Lateral  
DRESSING STATUS Clean, dry, and intact DRESSING TYPE Unna boot Non-Pressure Injury Partial thickness (epider/derm) Condition of Base Pink Condition of Edges Open Tissue Type Percent Pink 100 Drainage Amount  Small Drainage Color Serous Wound Odor None Cleansing and Cleansing Agents  Dermal wound cleanser Dressing Type Applied Unna boot 
(coban, sorbion sachet) Procedure Tolerated Well

## 2019-01-22 ENCOUNTER — HOSPITAL ENCOUNTER (OUTPATIENT)
Dept: WOUND CARE | Age: 79
Discharge: HOME | End: 2019-01-22
Payer: MEDICARE

## 2019-01-22 VITALS
SYSTOLIC BLOOD PRESSURE: 166 MMHG | TEMPERATURE: 97.5 F | DIASTOLIC BLOOD PRESSURE: 76 MMHG | RESPIRATION RATE: 20 BRPM | HEART RATE: 107 BPM | OXYGEN SATURATION: 100 %

## 2019-01-22 PROCEDURE — 29580 STRAPPING UNNA BOOT: CPT

## 2019-01-28 NOTE — WOUND CARE
01/22/19 1505 Wound Leg Lower Medial;Left Date First Assessed/Time First Assessed: 01/07/19 1330   POA: Yes  Wound Type: Blister/bullae  Location: Leg Lower  Orientation: Medial;Left DRESSING STATUS Clean, dry, and intact Non-Pressure Injury Full thickness (subcut/muscle) Wound Length (cm) 0.3 cm Wound Width (cm) 0.4 cm Wound Depth (cm) 0.1 Wound Surface area (cm^2) 0.12 cm^2 Change in Wound Size % 99.35 Condition of Base Pink Condition of Edges Open Tissue Type Percent Pink 100 Drainage Amount  Scant Drainage Color Serous Cleansing and Cleansing Agents  (foam wash, vashe and barrier wipes) Dressing Type Applied Unna boot Procedure Tolerated Well

## 2019-01-29 ENCOUNTER — HOSPITAL ENCOUNTER (OUTPATIENT)
Dept: WOUND CARE | Age: 79
Discharge: HOME | End: 2019-01-29
Payer: MEDICARE

## 2019-01-29 VITALS
TEMPERATURE: 98.2 F | DIASTOLIC BLOOD PRESSURE: 67 MMHG | SYSTOLIC BLOOD PRESSURE: 142 MMHG | HEART RATE: 91 BPM | RESPIRATION RATE: 18 BRPM | OXYGEN SATURATION: 100 %

## 2019-01-29 PROCEDURE — 99214 OFFICE O/P EST MOD 30 MIN: CPT

## 2019-01-29 NOTE — WOUND CARE
01/29/19 1407 Wound Leg Lower Medial;Left Date First Assessed/Time First Assessed: 01/07/19 1330   POA: Yes  Wound Type: Blister/bullae  Location: Leg Lower  Orientation: Medial;Left Dressing Status  Clean, dry, and intact Dressing Type  Unna boot Wound Length (cm) (wound is clinically closed) Cleansing and Cleansing Agents  (foam wash and barrier wipes) Dressing Type Applied (farrow wrap applied) Procedure Tolerated Well

## 2019-01-31 NOTE — WOUND CARE
[facility] Follow up note. Chief Complaint: 
Eloise Parikh is a 70 y.o.  male who presents for follow up of follow up of right buttock pressure ulcer and bilateral lymphedema.  
  
Right buttock pressure ulcer healed. he has lymphedema for about 5 years. He has on and off venous ulcers. He has left leg venous ulcers. He reports his PCP started him on antibiotic daily and he is suppose to take it for long time. He is poor historian and does not remember why he is started on antibiotic and which antibiotic. Review of systems General: No fevers or chills. Cardiovascular: No chest pain or pressure. No palpitations. Pulmonary: No shortness of breath. Gastrointestinal: No nausea, vomiting. Derm: See above Physical Exam:  
 
Visit Vitals /67 (BP 1 Location: Left arm, BP Patient Position: Sitting) Pulse 91 Temp 98.2 °F (36.8 °C) Resp 18 SpO2 100% General: well developed, well nourished, pleasant , NAD. Hygiene good Psych: cooperative. Pleasant. No anxiety or depression. Normal mood and affect. Neuro: alert and oriented to person/place/situation. Otherwise nonfocal. 
Derm: Skin turgor normal for age, dry skin HEENT: Normocephalic, atraumatic. EOMI. Conjunctiva clear. No scleral icterus. Chest: Good air entry bilaterally. Respirations non labored Cardio[de-identified] Normal heart sounds,no rubs, murmurs or gallops Abdomen: Soft, nontender, nondistended, normoactive bowel sounds Lower extremities: color normal; temperature normal. Calves are supple, nontender. Focussed Lower Extremity Exam: 
Vascular exam: 
Bilateral lower extremity: moderate  edema, foot ,  
DP pulse : Bilateral, 2+ 
PT pulse: Bilateral, 2+ Venous ulcer left leg. And small ulcer right leg Wound Description: Wound Length: (wound is clinically closed) Wound Width :  
 
Wound Depth :  
 
Etiology: venous stasis Ulcer bed: Mixed Granular/Fibrotic Periwound: Reddened Exudate: Scant/small amount Serosanguinous exudate Data Review: No results found for this or any previous visit (from the past 24 hour(s)). Assessment:  
 
Patient Active Problem List  
Diagnosis Code  Lymphedema I89.0  Chronic venous insufficiency I87.2  Pressure ulcer of contiguous region involving back and right buttock, stage 2 L89.42  
 Intertrigo L30.4  Venous ulcer of left leg ([facility]) I83.029, L97.929  
 
70 y.o. male with bilateral venous ulcer of left leg and small ulcer right leg Pressure ulcer sacral area all healed. Needs : 
 
Edema management Nutrition optimization Good Diabetic control Plan:  
Compression stockings Follow up as needed Signed By: Lavell Chen MD   
 January 30, 2019

## 2019-03-06 ENCOUNTER — HOSPITAL ENCOUNTER (OUTPATIENT)
Dept: WOUND CARE | Age: 79
Discharge: HOME | End: 2019-03-06
Payer: MEDICARE

## 2019-03-06 VITALS
RESPIRATION RATE: 18 BRPM | SYSTOLIC BLOOD PRESSURE: 152 MMHG | HEART RATE: 98 BPM | DIASTOLIC BLOOD PRESSURE: 70 MMHG | OXYGEN SATURATION: 100 % | TEMPERATURE: 98.5 F

## 2019-03-06 PROBLEM — L89.42 PRESSURE ULCER OF CONTIGUOUS REGION INVOLVING BACK AND RIGHT BUTTOCK, STAGE 2 (HCC): Status: RESOLVED | Noted: 2018-12-04 | Resolved: 2019-03-06

## 2019-03-06 PROBLEM — L97.919 VENOUS ULCER OF RIGHT LEG (HCC): Status: ACTIVE | Noted: 2019-03-06

## 2019-03-06 PROBLEM — I83.019 VENOUS ULCER OF RIGHT LEG (HCC): Status: ACTIVE | Noted: 2019-03-06

## 2019-03-06 PROCEDURE — 29580 STRAPPING UNNA BOOT: CPT

## 2019-03-06 NOTE — DISCHARGE INSTRUCTIONS
Patient to return for wound care in:  14 Days   Follow up with Nurse visit as recommended. PLEASE CONTACT OFFICE AS SOON AS POSSIBLE IF UNABLE TO MAKE THIS APPOINTMENT. Inspect your wounds, looking for signs of infection which may include the following:  Increase in redness  Red \"streaks\" from wound  Increase in swelling  Fever  Unusual odor  Change in the amount of wound drainage. Should you experience any significant changes in your wound(s) or have any questions regarding your home care instructions please contact the wound center or your home health company. If after regular business hours, please call your family doctor or local emergency room. Edema Control:   Elevate legs as much as possible. Avoid standing in one position for more than 10 minutes. Avoid setting with legs down. Do not cross legs while sitting.     03/06/19 3357

## 2019-03-06 NOTE — WOUND CARE
03/06/19 1515   Wound Leg Lower Lateral;Right;Posterior   Date First Assessed/Time First Assessed: 03/06/19 1513   Wound Type: Venous  Location: Leg Lower  Orientation: Lateral;Right;Posterior   Dressing Status  Intact; Old drainage   Dressing Type  Compression Wrap/Venous Stasis  (Farrow wraps)   Wound Length (cm) 2 cm   Wound Width (cm) 3 cm   Wound Depth (cm) 0.1   Wound Surface area (cm^2) 6 cm^2   Condition of Base Granulation   Condition of Edges Open   Tissue Type Pink;Red   Tissue Type Percent Pink 80   Tissue Type Percent Red 20   Drainage Amount  Small    Drainage Color Serous   Wound Odor Musty;Strong   Cleansing and Cleansing Agents  Dermal wound cleanser; Soap and water   Dressing Type Applied Collagens/cell matrix; Unna boot  (coban, bacitracin, exudry, brendan,)

## 2019-03-06 NOTE — WOUND CARE
[address] [facility]  Follow up note. Chief Complaint:  Giovanni Aldridge is a 70 y.o.  male who presents with new open wound of his right leg      he has lymphedema for about 5 years. He has on and off venous ulcers. He was seen here previously for venous ulcer and it was healed after unna boots and compression stockings. He has not been compliant with wearing stockings and keeping his legs up. He also has very dry scaly skin and was advised to use moisturizing cream.          Review of systems  General: No fevers or chills. Cardiovascular: No chest pain or pressure. No palpitations. Pulmonary: No shortness of breath. Gastrointestinal: No nausea, vomiting. Derm: See above                Physical Exam:     Visit Vitals  /70 (BP 1 Location: Right arm, BP Patient Position: At rest;Sitting)   Pulse 98   Temp 98.5 °F (36.9 °C)   Resp 18   SpO2 100%     General: well developed, well nourished, pleasant , NAD. Hygiene good  Psych: cooperative. Pleasant. No anxiety or depression. Normal mood and affect. Neuro: alert and oriented to person/place/situation. Otherwise nonfocal.  Derm: Skin turgor normal for age, dry skin  HEENT: Normocephalic, atraumatic. EOMI. Conjunctiva clear. No scleral icterus. Chest: Good air entry bilaterally. Respirations non labored  Cardio[de-identified] Normal heart sounds,no rubs, murmurs or gallops  Abdomen: Soft, nontender, nondistended, normoactive bowel sounds  Lower extremities: color normal; temperature normal. Calves are supple, nontender. Focussed Lower Extremity Exam:  Vascular exam:  Bilateral lower extremity: moderate  edema, foot ,   DP pulse : Bilateral, 2+  PT pulse: Bilateral, 2+  Venous ulcer right leg.      Wound Description:   Wound Length: 2 cm    Wound Width :3 cm    Wound Depth :0.1    Etiology: venous stasis  Ulcer bed: Mixed Granular/Fibrotic    Periwound: Reddened  Exudate: Scant/small amount Serosanguinous exudate    Data Review:   No results found for this or any previous visit (from the past 24 hour(s)). Assessment:     Patient Active Problem List   Diagnosis Code    Lymphedema I89.0    Chronic venous insufficiency I87.2    Intertrigo L30.4    [facility] I83.019, L97.919     70 y.o. male with bilateral venous ulcer of left right leg     Needs :  Edema management  Nutrition optimization  Good Diabetic control  Plan:     Unna boots  In wound care clinic today:  Cleanse wound with NS or soap and water or commercial wound cleanser  Apply the following topically to wound bed: adilene. Apply the following to elizabeth-wound: NA  Apply the following dressings: Absorptive dressing  Moisturizing cream to both legs  For Home Care/Self Care:  Cleanse wound with NS or soap and water or commercial wound cleanser  Keep dressing dry and intact when bathing  Apply the following to wound bed: adilene  Apply the following to skin around wound: NA  Apply the following dressings: Absorptive dressing  Moisturizing cream both legs. Patient to return for wound care in:  14 Days  Follow up with Nurse visit as recommended. PLEASE CONTACT OFFICE AS SOON AS POSSIBLE IF UNABLE TO MAKE THIS APPOINTMENT. Inspect your wounds, looking for signs of infection which may include the following:  Increase in redness  Red \"streaks\" from wound  Increase in swelling  Fever  Unusual odor  Change in the amount of wound drainage. Should you experience any significant changes in your wound(s) or have any questions regarding your home care instructions please contact the wound center or your home health company. If after regular business hours, please call your family doctor or local emergency room. Edema Control:   Elevate legs as much as possible. Avoid standing in one position for more than 10 minutes. Avoid setting with legs down. Do not cross legs while sitting.         Signed By: Edil Day MD     March 6, 2019

## 2019-03-13 ENCOUNTER — HOSPITAL ENCOUNTER (OUTPATIENT)
Dept: WOUND CARE | Age: 79
Discharge: HOME | End: 2019-03-13
Payer: MEDICARE

## 2019-03-13 VITALS
OXYGEN SATURATION: 100 % | TEMPERATURE: 97.3 F | HEART RATE: 90 BPM | SYSTOLIC BLOOD PRESSURE: 148 MMHG | RESPIRATION RATE: 18 BRPM | DIASTOLIC BLOOD PRESSURE: 62 MMHG

## 2019-03-13 PROCEDURE — 29580 STRAPPING UNNA BOOT: CPT

## 2019-03-13 NOTE — WOUND CARE
03/13/19 1401   Wound Leg Lower Lateral;Right;Posterior   Date First Assessed/Time First Assessed: 03/06/19 1513   Wound Type: Venous  Location: Leg Lower  Orientation: Lateral;Right;Posterior   Dressing Status  Clean, dry, and intact   Dressing Type  Unna boot  (brendan, gauze )   Wound Length (cm) 0.5 cm   Wound Width (cm) 0.3 cm   Wound Depth (cm) 0.1   Wound Surface area (cm^2) 0.15 cm^2   Change in Wound Size % 97.5   Condition of Base Granulation   Condition of Edges Open   Tissue Type Pink;Red   Tissue Type Percent Pink 80   Tissue Type Percent Red 20   Drainage Amount  Small    Drainage Color Serous   Wound Odor Strong   Periwound Skin Condition Erythema, blanchable   Dressing Type Applied Unna boot  (bacitracin, gauze, brendan)   Procedure Tolerated Well   Wound Leg Right;Lateral   Date First Assessed/Time First Assessed: 01/07/19 1330   POA: Yes  Wound Type: Blister/bullae  Location: Leg  Orientation: Right;Lateral   Dressing Status  Clean, dry, and intact   Dressing Type  Unna boot  (brendan, gauze)   Wound Length (cm) 0.5 cm   Wound Width (cm) 0.5 cm   Wound Depth (cm) 0.1   Wound Surface area (cm^2) 0.25 cm^2   Change in Wound Size % 91.67   Condition of Base Granulation   Condition of Edges Open   Tissue Type Pink;Red   Tissue Type Percent Pink 80   Tissue Type Percent Red 20   Drainage Amount  Small    Drainage Color Serous   Wound Odor Strong   Periwound Skin Condition Erythema, blanchable   Cleansing and Cleansing Agents  (foam wash and barrier wipes)   Dressing Type Applied Unna boot  (bacitracin, gauze, brendan)   Procedure Tolerated Well

## 2019-03-19 ENCOUNTER — HOSPITAL ENCOUNTER (OUTPATIENT)
Dept: WOUND CARE | Age: 79
Discharge: HOME | End: 2019-03-19
Payer: MEDICARE

## 2019-03-19 VITALS
OXYGEN SATURATION: 99 % | BODY MASS INDEX: 23.19 KG/M2 | WEIGHT: 175 LBS | HEIGHT: 73 IN | SYSTOLIC BLOOD PRESSURE: 153 MMHG | DIASTOLIC BLOOD PRESSURE: 61 MMHG | TEMPERATURE: 98.2 F | HEART RATE: 93 BPM | RESPIRATION RATE: 20 BRPM

## 2019-03-19 PROCEDURE — 99211 OFF/OP EST MAY X REQ PHY/QHP: CPT

## 2019-03-19 NOTE — WOUND CARE
03/19/19 1426 Wound Leg Lower Lateral;Right;Posterior Date First Assessed/Time First Assessed: 03/06/19 1513   Wound Type: Venous  Location: Leg Lower  Orientation: Lateral;Right;Posterior Dressing Status  Clean, dry, and intact Dressing Type  (coban, unna boot, ) Wound Length (cm) (clinically closed) Condition of Edges Closed Epithelialization (%) 100 Periwound Skin Condition Intact Dressing Type Applied (farrow wrap applied) Procedure Tolerated Well Wound Leg Right;Lateral  
Date First Assessed/Time First Assessed: 01/07/19 1330   POA: Yes  Wound Type: Blister/bullae  Location: Leg  Orientation: Right;Lateral  
Dressing Status  Clean, dry, and intact Dressing Type  (coban, unna boot) Wound Length (cm) (wound clinically closed) Epithelialization (%) 100 Drainage Amount  None Periwound Skin Condition Erythema, blanchable Cleansing and Cleansing Agents  (foam wash and barrier wipes) Dressing Type Applied (farrow wraps) Procedure Tolerated Well

## 2019-03-26 ENCOUNTER — HOSPITAL ENCOUNTER (OUTPATIENT)
Dept: WOUND CARE | Age: 79
Discharge: HOME | End: 2019-03-26
Payer: MEDICARE

## 2019-03-26 VITALS
TEMPERATURE: 97.5 F | HEART RATE: 94 BPM | OXYGEN SATURATION: 99 % | RESPIRATION RATE: 19 BRPM | SYSTOLIC BLOOD PRESSURE: 156 MMHG | DIASTOLIC BLOOD PRESSURE: 60 MMHG

## 2019-03-26 PROCEDURE — 99214 OFFICE O/P EST MOD 30 MIN: CPT

## 2019-03-27 NOTE — WOUND CARE
310 [facility] ER Follow up note. Chief Complaint: 
Charline Love is a 70 y.o.  male who presents with new open wound of his right leg  
 
 he has lymphedema for about 5 years. He has on and off venous ulcers. He was seen here previously for venous ulcer and it was healed after unna boots and compression stockings. He has not been compliant with wearing stockings and keeping his legs up. He also has very dry scaly skin and was advised to use moisturizing cream. 
 
 
 
 
Review of systems General: No fevers or chills. Cardiovascular: No chest pain or pressure. No palpitations. Pulmonary: No shortness of breath. Gastrointestinal: No nausea, vomiting. Derm: See above Physical Exam:  
 
Visit Vitals /60 (BP 1 Location: Left arm, BP Patient Position: Sitting) Pulse 94 Temp 97.5 °F (36.4 °C) Resp 19 SpO2 99% General: well developed, well nourished, pleasant , NAD. Hygiene good Psych: cooperative. Pleasant. No anxiety or depression. Normal mood and affect. Neuro: alert and oriented to person/place/situation. Otherwise nonfocal. 
Derm: Skin turgor normal for age, dry skin HEENT: Normocephalic, atraumatic. EOMI. Conjunctiva clear. No scleral icterus. Chest: Good air entry bilaterally. Respirations non labored Cardio[de-identified] Normal heart sounds,no rubs, murmurs or gallops Abdomen: Soft, nontender, nondistended, normoactive bowel sounds Lower extremities: color normal; temperature normal. Calves are supple, nontender. Focussed Lower Extremity Exam: 
Vascular exam: 
Bilateral lower extremity: moderate  edema, foot ,  
DP pulse : Bilateral, 2+ 
PT pulse: Bilateral, 2+ Venous ulcer right leg. Wound Description: Wound Length: (no open areas observed) Wound Width :  
 
Wound Depth :  
 
Etiology: venous stasis Ulcer bed: Mixed Granular/Fibrotic Periwound: Reddened Exudate: Scant/small amount Serosanguinous exudate Data Review: No results found for this or any previous visit (from the past 24 hour(s)). Assessment:  
 
Patient Active Problem List  
Diagnosis Code  Lymphedema I89.0  Chronic venous insufficiency I87.2  Intertrigo L30.4  Venous ulcer of right leg ([facility]) I83.019, L97.919  
 
70 y.o. male with bilateral venous ulcer of left right leg now healed Needs : 
Edema management Good Diabetic control Plan:  
Follow up as needed. Signed By: Chaz Contreras MD   
 March 26, 2019

## 2019-03-28 NOTE — WOUND CARE
03/26/19 1413 Wound Leg Lower Lateral;Right;Posterior Date First Assessed/Time First Assessed: 03/06/19 1513   Wound Type: Venous  Location: Leg Lower  Orientation: Lateral;Right;Posterior Dressing Status  Clean, dry, and intact Dressing Type   
(farrow wraps) Condition of Edges (no open areas observed) Drainage Amount  None Periwound Skin Condition Intact 
(pt rubbed skin off front of leg, red area observed) Dressing Type Applied (farrow wrap applied) Procedure Tolerated Well Wound Leg Right;Lateral  
Date First Assessed/Time First Assessed: 01/07/19 1330   POA: Yes  Wound Type: Blister/bullae  Location: Leg  Orientation: Right;Lateral  
Dressing Status  Clean, dry, and intact Dressing Type   
(farrow wrap) Wound Length (cm)  
(no open areas observed) Epithelialization (%) 100 Drainage Amount  None Periwound Skin Condition  
(dry, flaky skin) Procedure Tolerated Well

## 2019-04-08 ENCOUNTER — HOSPITAL ENCOUNTER (EMERGENCY)
Age: 79
Discharge: HOME | End: 2019-04-08
Attending: EMERGENCY MEDICINE | Admitting: EMERGENCY MEDICINE
Payer: MEDICARE

## 2019-04-08 VITALS
TEMPERATURE: 99.2 F | OXYGEN SATURATION: 100 % | DIASTOLIC BLOOD PRESSURE: 60 MMHG | HEIGHT: 73 IN | RESPIRATION RATE: 20 BRPM | HEART RATE: 91 BPM | SYSTOLIC BLOOD PRESSURE: 135 MMHG | BODY MASS INDEX: 21.87 KG/M2 | WEIGHT: 165 LBS

## 2019-04-08 DIAGNOSIS — I89.0 CHRONIC ACQUIRED LYMPHEDEMA: ICD-10-CM

## 2019-04-08 DIAGNOSIS — L03.115 CELLULITIS OF RIGHT LOWER LEG: Primary | ICD-10-CM

## 2019-04-08 LAB
ALBUMIN SERPL-MCNC: 2.7 G/DL (ref 3.4–5)
ALBUMIN/GLOB SERPL: 0.7 {RATIO} (ref 0.8–1.7)
ALP SERPL-CCNC: 107 U/L (ref 45–117)
ALT SERPL-CCNC: 13 U/L (ref 16–61)
ANION GAP SERPL CALC-SCNC: 5 MMOL/L (ref 3–18)
AST SERPL-CCNC: 12 U/L (ref 15–37)
BASOPHILS # BLD: 0 K/UL (ref 0–0.1)
BASOPHILS NFR BLD: 0 % (ref 0–2)
BILIRUB SERPL-MCNC: 0.3 MG/DL (ref 0.2–1)
BUN SERPL-MCNC: 20 MG/DL (ref 7–18)
BUN/CREAT SERPL: 20 (ref 12–20)
CALCIUM SERPL-MCNC: 8.5 MG/DL (ref 8.5–10.1)
CHLORIDE SERPL-SCNC: 105 MMOL/L (ref 100–108)
CO2 SERPL-SCNC: 29 MMOL/L (ref 21–32)
CREAT SERPL-MCNC: 1.01 MG/DL (ref 0.6–1.3)
DIFFERENTIAL METHOD BLD: ABNORMAL
EOSINOPHIL # BLD: 0.1 K/UL (ref 0–0.4)
EOSINOPHIL NFR BLD: 2 % (ref 0–5)
ERYTHROCYTE [DISTWIDTH] IN BLOOD BY AUTOMATED COUNT: 17.4 % (ref 11.6–14.5)
GLOBULIN SER CALC-MCNC: 4 G/DL (ref 2–4)
GLUCOSE SERPL-MCNC: 216 MG/DL (ref 74–99)
HCT VFR BLD AUTO: 33.9 % (ref 36–48)
HGB BLD-MCNC: 10.8 G/DL (ref 13–16)
LYMPHOCYTES # BLD: 1.4 K/UL (ref 0.9–3.6)
LYMPHOCYTES NFR BLD: 18 % (ref 21–52)
MCH RBC QN AUTO: 25.1 PG (ref 24–34)
MCHC RBC AUTO-ENTMCNC: 31.9 G/DL (ref 31–37)
MCV RBC AUTO: 78.7 FL (ref 74–97)
MONOCYTES # BLD: 0.6 K/UL (ref 0.05–1.2)
MONOCYTES NFR BLD: 8 % (ref 3–10)
NEUTS SEG # BLD: 5.5 K/UL (ref 1.8–8)
NEUTS SEG NFR BLD: 72 % (ref 40–73)
PLATELET # BLD AUTO: 363 K/UL (ref 135–420)
PMV BLD AUTO: 9.3 FL (ref 9.2–11.8)
POTASSIUM SERPL-SCNC: 4.5 MMOL/L (ref 3.5–5.5)
PROT SERPL-MCNC: 6.7 G/DL (ref 6.4–8.2)
RBC # BLD AUTO: 4.31 M/UL (ref 4.7–5.5)
SODIUM SERPL-SCNC: 139 MMOL/L (ref 136–145)
WBC # BLD AUTO: 7.7 K/UL (ref 4.6–13.2)

## 2019-04-08 PROCEDURE — 74011250637 HC RX REV CODE- 250/637: Performed by: PHYSICIAN ASSISTANT

## 2019-04-08 PROCEDURE — 80053 COMPREHEN METABOLIC PANEL: CPT

## 2019-04-08 PROCEDURE — 87070 CULTURE OTHR SPECIMN AEROBIC: CPT

## 2019-04-08 PROCEDURE — 85025 COMPLETE CBC W/AUTO DIFF WBC: CPT

## 2019-04-08 PROCEDURE — 87077 CULTURE AEROBIC IDENTIFY: CPT

## 2019-04-08 PROCEDURE — 99283 EMERGENCY DEPT VISIT LOW MDM: CPT

## 2019-04-08 PROCEDURE — 87186 SC STD MICRODIL/AGAR DIL: CPT

## 2019-04-08 RX ORDER — CLINDAMYCIN HYDROCHLORIDE 300 MG/1
300 CAPSULE ORAL 3 TIMES DAILY
Qty: 21 CAP | Refills: 0 | Status: SHIPPED | OUTPATIENT
Start: 2019-04-08 | End: 2019-04-15

## 2019-04-08 RX ORDER — CLINDAMYCIN HYDROCHLORIDE 150 MG/1
300 CAPSULE ORAL
Status: COMPLETED | OUTPATIENT
Start: 2019-04-08 | End: 2019-04-08

## 2019-04-08 RX ADMIN — CLINDAMYCIN HYDROCHLORIDE 300 MG: 150 CAPSULE ORAL at 15:57

## 2019-04-08 NOTE — DISCHARGE INSTRUCTIONS
Patient Education        Cellulitis: Care Instructions  Your Care Instructions    Cellulitis is a skin infection caused by bacteria, most often strep or staph. It often occurs after a break in the skin from a scrape, cut, bite, or puncture, or after a rash. Cellulitis may be treated without doing tests to find out what caused it. But your doctor may do tests, if needed, to look for a specific bacteria, like methicillin-resistant Staphylococcus aureus (MRSA). The doctor has checked you carefully, but problems can develop later. If you notice any problems or new symptoms, get medical treatment right away. Follow-up care is a key part of your treatment and safety. Be sure to make and go to all appointments, and call your doctor if you are having problems. It's also a good idea to know your test results and keep a list of the medicines you take. How can you care for yourself at home? · Take your antibiotics as directed. Do not stop taking them just because you feel better. You need to take the full course of antibiotics. · Prop up the infected area on pillows to reduce pain and swelling. Try to keep the area above the level of your heart as often as you can. · If your doctor told you how to care for your wound, follow your doctor's instructions. If you did not get instructions, follow this general advice:  ? Wash the wound with clean water 2 times a day. Don't use hydrogen peroxide or alcohol, which can slow healing. ? You may cover the wound with a thin layer of petroleum jelly, such as Vaseline, and a nonstick bandage. ? Apply more petroleum jelly and replace the bandage as needed. · Be safe with medicines. Take pain medicines exactly as directed. ? If the doctor gave you a prescription medicine for pain, take it as prescribed. ? If you are not taking a prescription pain medicine, ask your doctor if you can take an over-the-counter medicine.   To prevent cellulitis in the future  · Try to prevent cuts, scrapes, or other injuries to your skin. Cellulitis most often occurs where there is a break in the skin. · If you get a scrape, cut, mild burn, or bite, wash the wound with clean water as soon as you can to help avoid infection. Don't use hydrogen peroxide or alcohol, which can slow healing. · If you have swelling in your legs (edema), support stockings and good skin care may help prevent leg sores and cellulitis. · Take care of your feet, especially if you have diabetes or other conditions that increase the risk of infection. Wear shoes and socks. Do not go barefoot. If you have athlete's foot or other skin problems on your feet, talk to your doctor about how to treat them. When should you call for help? Call your doctor now or seek immediate medical care if:    · You have signs that your infection is getting worse, such as:  ? Increased pain, swelling, warmth, or redness. ? Red streaks leading from the area. ? Pus draining from the area. ? A fever.     · You get a rash.    Watch closely for changes in your health, and be sure to contact your doctor if:    · You do not get better as expected. Where can you learn more? Go to http://miguel-sugey.info/. Grazyna Hyatt in the search box to learn more about \"Cellulitis: Care Instructions. \"  Current as of: April 17, 2018  Content Version: 11.9  © 9206-3250 [employer]. Care instructions adapted under license by [employer] (which disclaims liability or warranty for this information). If you have questions about a medical condition or this instruction, always ask your healthcare professional. [place] [address] any warranty or liability for your use of this information. Patient Education        Lymphedema: Care Instructions  Your Care Instructions    Lymphedema is fluid that builds up in the arms or legs.  It is often caused by surgery to remove lymph nodes during cancer treatment, especially breast cancer surgery, which can cause fluid to build up in the arm. It can happen after radiation treatment to an area that involves lymph nodes. It also can be caused by a fractured bone or surgery to fix a fracture. And some medicines also can cause lymphedema. Some people get it for unknown reasons. Normally, lymph nodes trap bacteria and other substances as fluid flows through them. Then, the white cells in the body's defense, or immune, system can destroy the substances. But if there are few or no lymph nodes--or if the lymph system in an arm or leg has been damaged--fluid can build up in the affected arm or leg. You can take simple steps at home to help treat or prevent fluid buildup. Treatment may include raising the arm or leg to let gravity drain the fluid. You also can wear compression stockings or sleeves. Follow-up care is a key part of your treatment and safety. Be sure to make and go to all appointments, and call your doctor if you are having problems. It's also a good idea to know your test results and keep a list of the medicines you take. How can you care for yourself at home? · Wear a compression stocking or sleeve as your doctor suggests. It can help keep fluid from pooling in an arm or leg. Wear it during air travel. · Prop up the swollen arm or leg on a pillow anytime you sit or lie down. Try to keep it above the level of your heart. This will help reduce swelling. · Avoid crossing your legs if your legs are swollen. · Get some exercise on most days of the week. Increase the intensity of exercise slowly. Water aerobics can help reduce swelling by helping fluid move around. Wear your compression stocking or sleeve during exercise, but not during water exercise. · See a physical therapist. He or she can teach you how to do self-massage to help fluid move around. You also can learn what activities would be best for you. · Keep your feet clean and wear clean socks or stockings every day. Check your feet often for signs of infection, such as redness or heat. Do not walk barefoot. · If you have had lymph nodes removed from under your arm:  ? Do not have blood drawn from the arm on the side of the lymph node surgery. ? Do not allow a blood pressure cuff to be placed on that arm. If you are in the hospital, make sure your nurse and other hospital staff know of your condition. ? Wear gloves when gardening or doing other activities that may lead to cuts on your fingers or hands. · If you have had lymph nodes removed from your groin:  ? Bathe your feet daily in lukewarm, not hot, water. Use a mild soap that has a moisturizer, or use a moisturizer separately. ? Check your feet for blisters or cuts. ? Wear comfortable and supportive shoes that fit properly. ? Wear the correct size of panty hose and stockings. Avoid garters or knee-high or thigh-high stockings. · Ask your doctor how to treat any cuts, scratches, insect bites, or other injuries that may occur. · Use sunscreen and insect repellent when outdoors to protect your skin from sunburn and insect bites. · Wear medical alert jewelry that says you have lymphedema. You can buy these at most drugstores and on the Internet. When should you call for help? Call your doctor now or seek immediate medical care if:    · You have signs of infection, such as:  ? Increased pain, swelling, warmth, or redness. ? Red streaks leading from the area. ? Pus draining from the area. ? A fever.    Watch closely for changes in your health, and be sure to contact your doctor if:    · You have new or worse symptoms from lymphedema.     · You do not get better as expected. Where can you learn more? Go to http://miguel-sugey.info/. Enter V398 in the search box to learn more about \"Lymphedema: Care Instructions. \"  Current as of: March 27, 2018  Content Version: 11.9  © 6774-3967 [employer].  Care instructions adapted under license by [employer] (which disclaims liability or warranty for this information). If you have questions about a medical condition or this instruction, always ask your healthcare professional. [address] any warranty or liability for your use of this information.

## 2019-04-08 NOTE — ED NOTES
Patient does not want vascular study. States [facility] did one 2 weeks ago with no change in S/S since. PA aware. Wound care consulted.

## 2019-04-08 NOTE — ED PROVIDER NOTES
EMERGENCY DEPARTMENT HISTORY AND PHYSICAL EXAM 
 
Date: 4/8/2019 Patient Name: Samia Smith History of Presenting Illness Chief Complaint Patient presents with  
 Skin Problem  Ankle swelling History Provided By: Patient Samia Smith is a 70 y.o. male with PMHX of chronic lymphedema diet-controlled diabetes who presents to the emergency department C/O right lower extremity swelling. Associated sxs include right lower extremity erythema and wound with clear yellow drainage. Pt denies leg pain fever fall inability to stand or walk, and any other sxs or complaints. PCP: Artemio Bello MD 
 
 
 
Past History Past Medical History: 
Past Medical History:  
Diagnosis Date  Anemia  Aortic stenosis  At risk for falls  DM (diabetes mellitus) ([facility] [address]  GI bleed  Lymphedema  Pressure ulcer of contiguous region involving back and right buttock, stage 2 12/4/2018 Past Surgical History: 
History reviewed. No pertinent surgical history. Family History: 
History reviewed. No pertinent family history. Social History: 
Social History Tobacco Use  Smoking status: Never Smoker  Smokeless tobacco: Never Used Substance Use Topics  Alcohol use: Not on file  Drug use: Not on file Allergies: Allergies Allergen Reactions  Pcn [Penicillins] Hives, Shortness of Breath and Itching Review of Systems Review of Systems Constitutional: Negative for fever. Musculoskeletal: Negative for arthralgias and gait problem. Skin: Positive for color change and wound. Physical Exam  
 
Vitals:  
 04/08/19 1229 04/08/19 1514 BP: 147/60 135/60 Pulse: (!) 110 91 Resp: 20 Temp: 99.2 °F (37.3 °C) SpO2: 100% 100% Weight: 74.8 kg (165 lb) Height: 6' 1\" (1.854 m) Physical Exam  
Constitutional: He is oriented to person, place, and time. He appears well-developed and well-nourished. No distress. Ambulates with a cane HENT:  
Head: Normocephalic and atraumatic. Eyes: Pupils are equal, round, and reactive to light. Conjunctivae and EOM are normal.  
Neck: Normal range of motion. Neck supple. Cardiovascular: Normal rate and regular rhythm. Pulmonary/Chest: Effort normal and breath sounds normal.  
Musculoskeletal:  
+swollen BLEs with RLE >>LLE, +RLE is erythematous with weeping wounds of clear yellowish fluids, no abscess or streaking noted Neurological: He is alert and oriented to person, place, and time. Skin: Skin is warm and dry. Psychiatric: He has a normal mood and affect. His behavior is normal.  
Nursing note and vitals reviewed. Diagnostic Study Results Labs - Recent Results (from the past 12 hour(s)) CBC WITH AUTOMATED DIFF Collection Time: 04/08/19  1:43 PM  
Result Value Ref Range WBC 7.7 4.6 - 13.2 K/uL  
 RBC 4.31 (L) 4.70 - 5.50 M/uL  
 HGB 10.8 (L) 13.0 - 16.0 g/dL HCT 33.9 (L) 36.0 - 48.0 % MCV 78.7 74.0 - 97.0 FL  
 MCH 25.1 24.0 - 34.0 PG  
 MCHC 31.9 31.0 - 37.0 g/dL  
 RDW 17.4 (H) 11.6 - 14.5 % PLATELET 142 645 - 627 K/uL MPV 9.3 9.2 - 11.8 FL  
 NEUTROPHILS 72 40 - 73 % LYMPHOCYTES 18 (L) 21 - 52 % MONOCYTES 8 3 - 10 % EOSINOPHILS 2 0 - 5 % BASOPHILS 0 0 - 2 %  
 ABS. NEUTROPHILS 5.5 1.8 - 8.0 K/UL  
 ABS. LYMPHOCYTES 1.4 0.9 - 3.6 K/UL  
 ABS. MONOCYTES 0.6 0.05 - 1.2 K/UL  
 ABS. EOSINOPHILS 0.1 0.0 - 0.4 K/UL  
 ABS. BASOPHILS 0.0 0.0 - 0.1 K/UL  
 DF AUTOMATED METABOLIC PANEL, COMPREHENSIVE Collection Time: 04/08/19  1:43 PM  
Result Value Ref Range Sodium 139 136 - 145 mmol/L Potassium 4.5 3.5 - 5.5 mmol/L Chloride 105 100 - 108 mmol/L  
 CO2 29 21 - 32 mmol/L Anion gap 5 3.0 - 18 mmol/L Glucose 216 (H) 74 - 99 mg/dL BUN 20 (H) 7.0 - 18 MG/DL Creatinine 1.01 0.6 - 1.3 MG/DL  
 BUN/Creatinine ratio 20 12 - 20 GFR est AA >60 >60 ml/min/1.73m2 GFR est non-AA >60 >60 ml/min/1.73m2  Calcium 8.5 8.5 - 10.1 MG/DL  
 Bilirubin, total 0.3 0.2 - 1.0 MG/DL  
 ALT (SGPT) 13 (L) 16 - 61 U/L  
 AST (SGOT) 12 (L) 15 - 37 U/L Alk. phosphatase 107 45 - 117 U/L Protein, total 6.7 6.4 - 8.2 g/dL Albumin 2.7 (L) 3.4 - 5.0 g/dL Globulin 4.0 2.0 - 4.0 g/dL A-G Ratio 0.7 (L) 0.8 - 1.7 Radiologic Studies - No orders to display CT Results  (Last 48 hours) None CXR Results  (Last 48 hours) None Medications given in the ED- Medications  
clindamycin (CLEOCIN) capsule 300 mg (has no administration in time range) Medical Decision Making I am the first provider for this patient. I reviewed the vital signs, available nursing notes, past medical history, past surgical history, family history and social history. Vital Signs-Reviewed the patient's vital signs. Pulse Oximetry Analysis - 100% on RA Records Reviewed: Nursing Notes Procedures: 
Procedures ED Course:  
Initial assessment performed. The patients presenting problems have been discussed, and they are in agreement with the care plan formulated and outlined with them. I have encouraged them to ask questions as they arise throughout their visit. 2:47 PM per Goldy Higgins RN from Dr. Jensen Thornton office she has had multiple prescriptions of Keflex though should not be on any antibiotics currently was received from another facility. Reviewed EMR patient has been seen in the wound care clinic here has been on clindamycin in the past for cellulitis. Patient refusing vascular study states Dr. Rhoda Vanegas pulmonologist has ordered vascular study 2 weeks ago patient reports of his negative results Discussion: 70 y.o. male history of chronic lymphedema and cellulitis in the past sent from wound care for evaluation of possible cellulitis. Patient afebrile normal white blood cell count does have some cellulitic areas right lower extremity.   Patient has been on clindamycin in the past from this facility with good results will plan for medicine wound care clinic and they will see him in 2 days in the office. Diagnosis and Disposition DISCHARGE NOTE: 
Delon Wilder  results have been reviewed with him. He has been counseled regarding his diagnosis, treatment, and plan. He verbally conveys understanding and agreement of the signs, symptoms, diagnosis, treatment and prognosis and additionally agrees to follow up as discussed. He also agrees with the care-plan and conveys that all of his questions have been answered. I have also provided discharge instructions for him that include: educational information regarding their diagnosis and treatment, and list of reasons why they would want to return to the ED prior to their follow-up appointment, should his condition change. He has been provided with education for proper emergency department utilization. CLINICAL IMPRESSION: 
 
1. Cellulitis of right lower leg 2. Chronic acquired lymphedema PLAN: 
1. D/C Home 2. Current Discharge Medication List  
  
START taking these medications Details  
clindamycin (CLEOCIN) 300 mg capsule Take 1 Cap by mouth three (3) times daily for 7 days. Qty: 21 Cap, Refills: 0  
  
  
 
3. Follow-up Information Follow up With Specialties Details Why Contact Info Additional Information Levi Sutton MD Family Practice Schedule an appointment as soon as possible for a visit  [address] [place] [address] 
[phone] [facility] [id number] [phone] Patients scheduled for OP Wound Care visits should enter the [facility], 2nd floor, [address] for check in. THE [facility] EMERGENCY DEPT Emergency Medicine  As needed, If symptoms worsen [address] [facility] [address] [phone] Please note that this dictation was completed with the[employer]ench, the [employer] voice recognition software. Quite often unanticipated grammatical, syntax, homophones, and other interpretive errors are inadvertently transcribed by the computer software. Please disregard these errors. Please excuse any errors that have escaped final proofreading.

## 2019-04-08 NOTE — ED TRIAGE NOTES
Patient with complaints of redness and swelling to the right lower extremity. Patient was a wound care patient and was discharged.

## 2019-04-08 NOTE — ED NOTES
Discharge paperwork given to patient. Patient verbalizes understanding. Armband removed and shredded. Patient given discharge instruction regarding antibiotic therapy. Advised of dosage and administrations per day. Advised to finish full course of antibiotics.

## 2019-04-10 LAB
BACTERIA SPEC CULT: ABNORMAL
BACTERIA SPEC CULT: ABNORMAL
GRAM STN SPEC: ABNORMAL
SERVICE CMNT-IMP: ABNORMAL

## 2019-04-11 ENCOUNTER — HOSPITAL ENCOUNTER (OUTPATIENT)
Dept: WOUND CARE | Age: 79
Discharge: HOME | End: 2019-04-11
Payer: MEDICARE

## 2019-04-11 VITALS
RESPIRATION RATE: 19 BRPM | OXYGEN SATURATION: 99 % | HEART RATE: 92 BPM | SYSTOLIC BLOOD PRESSURE: 128 MMHG | DIASTOLIC BLOOD PRESSURE: 62 MMHG | TEMPERATURE: 97.6 F

## 2019-04-11 PROCEDURE — 29580 STRAPPING UNNA BOOT: CPT

## 2019-04-11 NOTE — WOUND CARE
04/11/19 1004 Wound Leg Lower Lateral;Right;Posterior Date First Assessed/Time First Assessed: 03/06/19 1513   Wound Type: Venous  Location: Leg Lower  Orientation: Lateral;Right;Posterior Dressing Type   
(coban, 4x4,s) Periwound Skin Condition  
(Vashe wound cleanser) Procedure Tolerated Well Wound Leg Right;Lateral  
Date First Assessed/Time First Assessed: 01/07/19 1330   POA: Yes  Wound Type: Blister/bullae  Location: Leg  Orientation: Right;Lateral  
Dressing Status  Intact; Old drainage Dressing Type  Absorptive Wound Length (cm) 5 cm Wound Width (cm) 11 cm Wound Depth (cm) 0.1 Wound Surface area (cm^2) 55 cm^2 Change in Wound Size % -1733.33 Condition of Base Granulation;Pink Tissue Type Pink Tissue Type Percent Pink 80 Tissue Type Percent Red 20 Drainage Amount  None Periwound Skin Condition Erythema, blanchable Cleansing and Cleansing Agents  Other (comment) (Vashe wound) Dressing Type Applied Absorptive; Unna boot Procedure Tolerated Well

## 2019-04-11 NOTE — DISCHARGE INSTRUCTIONS
Patient to return for wound care in:  1 week for possible discharge    PLEASE CONTACT OFFICE AS SOON AS POSSIBLE IF UNABLE TO MAKE THIS APPOINTMENT. Inspect your wounds, looking for signs of infection which may include the following:  Increase in redness  Red \"streaks\" from wound  Increase in swelling  Fever  Unusual odor  Change in the amount of wound drainage. Should you experience any significant changes in your wound(s) or have any questions regarding your home care instructions please contact the wound center or your home health company. If after regular business hours, please call your family doctor or local emergency room. Edema Control:   Elevate legs as much as possible. Avoid standing in one position for more than 10 minutes. Avoid setting with legs down. Do not cross legs while sitting. Off-Loading:     Frequent position changes. Do not cross legs while sitting.  Shift weight every 20 minutes or more when sitting for prolonged periods of time.          Collection Information     Verbal Order Info     Action Created on Order Mode Entered by Comment Responsible Provider Signed by Signed on   Ordering 03/19/19 1442 Verbal with readback JAMAR Shaw MD Uvaldo Lint, MD 03/20/19 Channing Cantrell   Additional Information     Associated Reports   View Encounter   Priority and Order Details   NPI Information        Electronically signed by Angelito Chand Provider: Bob Brenner [id number]     Order start date/time: 3/19/2019 2:45 PM  Electronically signed by Co-signing Provider (if required):                 Order Report     Order Details

## 2019-04-18 ENCOUNTER — HOSPITAL ENCOUNTER (OUTPATIENT)
Dept: WOUND CARE | Age: 79
Discharge: HOME | End: 2019-04-18
Payer: MEDICARE

## 2019-04-18 PROCEDURE — 29580 STRAPPING UNNA BOOT: CPT

## 2019-04-18 NOTE — WOUND CARE
[address] [facility] Follow up note. Chief Complaint: 
Shakir Grider is a 70 y.o.  male who presents with new open wound of his right leg  
 
 he has lymphedema for about 5 years. He has on and off venous ulcers. He was seen here previously for venous ulcer and it was healed after unna boots and compression stockings. He has not been compliant with wearing stockings and keeping his legs up. He also has very dry scaly skin and was advised to use moisturizing cream. 
 
 
 
 
Review of systems General: No fevers or chills. Cardiovascular: No chest pain or pressure. No palpitations. Pulmonary: No shortness of breath. Gastrointestinal: No nausea, vomiting. Derm: See above Physical Exam:  
 
There were no vitals taken for this visit. General: well developed, well nourished, pleasant , NAD. Hygiene good Psych: cooperative. Pleasant. No anxiety or depression. Normal mood and affect. Neuro: alert and oriented to person/place/situation. Otherwise nonfocal. 
Derm: Skin turgor normal for age, dry skin HEENT: Normocephalic, atraumatic. EOMI. Conjunctiva clear. No scleral icterus. Chest: Good air entry bilaterally. Respirations non labored Cardio[de-identified] Normal heart sounds,+ murmur Abdomen: Soft, nontender, nondistended, normoactive bowel sounds Lower extremities: color normal; temperature normal. Calves are supple, nontender. Focussed Lower Extremity Exam: 
Vascular exam: 
Bilateral lower extremity: moderate  edema, foot ,  
DP pulse : Bilateral, 2+ 
PT pulse: Bilateral, 2+ Venous ulcer right leg. Wound Description: Wound Length: 0 cm Wound Width :0 cm Wound Depth :0 
 
Etiology: venous stasis Ulcer bed: Mixed Granular/Fibrotic Periwound: Reddened Exudate: Scant/small amount Serosanguinous exudate Data Review: No results found for this or any previous visit (from the past 24 hour(s)). Assessment:  
 
Patient Active Problem List  
Diagnosis Code  Lymphedema I89.0  Chronic venous insufficiency I87.2  Intertrigo L30.4  Venous ulcer of right leg ([facility]CC) I83.019, L97.919  
 
70 y.o. male with bilateral venous ulcer of  right leg Almost healed Needs : 
Edema management Good Diabetic control Plan:  
Continue with farrow wraps. Signed By: Sejal Gupta MD   
 April 18, 2019

## 2019-04-18 NOTE — WOUND CARE
04/18/19 1638 Wound Leg Lower Lateral;Right;Posterior Date First Assessed/Time First Assessed: 03/06/19 1513   Wound Type: Venous  Location: Leg Lower  Orientation: Lateral;Right;Posterior Dressing Status  Clean, dry, and intact Wound Leg Right;Lateral  
Date First Assessed/Time First Assessed: 01/07/19 1330   POA: Yes  Wound Type: Blister/bullae  Location: Leg  Orientation: Right;Lateral  
Dressing Status  Clean, dry, and intact Dressing Type  Absorptive Wound Length (cm) 0 cm Wound Width (cm) 0 cm Wound Depth (cm) 0 Wound Surface area (cm^2) 0 cm^2 Change in Wound Size % 100 Condition of Base Pink Tissue Type Pink Tissue Type Percent Pink 100 Drainage Amount  None Periwound Skin Condition Erythema, blanchable Cleansing and Cleansing Agents  Dermal wound cleanser Dressing Type Applied Absorptive Procedure Tolerated Well

## 2019-04-24 ENCOUNTER — HOSPITAL ENCOUNTER (OUTPATIENT)
Dept: WOUND CARE | Age: 79
Discharge: HOME | End: 2019-04-24
Payer: MEDICARE

## 2019-04-24 VITALS
RESPIRATION RATE: 19 BRPM | TEMPERATURE: 98.5 F | SYSTOLIC BLOOD PRESSURE: 145 MMHG | OXYGEN SATURATION: 98 % | HEART RATE: 102 BPM | DIASTOLIC BLOOD PRESSURE: 79 MMHG

## 2019-04-24 PROCEDURE — 99214 OFFICE O/P EST MOD 30 MIN: CPT

## 2019-04-24 NOTE — WOUND CARE
[address] [facility] Follow up note. Chief Complaint: 
Zoie Harris is a 70 y.o.  male who presents with new open wound of his right leg  
 
 he has lymphedema for about 5 years. He has on and off venous ulcers. He was seen here previously for venous ulcer and it was healed after unna boots and compression stockings. He has not been compliant with wearing stockings and keeping his legs up. He also has very dry scaly skin and was advised to use moisturizing cream. 
 
 
 
 
Review of systems General: No fevers or chills. Cardiovascular: No chest pain or pressure. No palpitations. Pulmonary: No shortness of breath. Gastrointestinal: No nausea, vomiting. Derm: See above Physical Exam:  
 
Visit Vitals /79 (BP 1 Location: Left arm, BP Patient Position: At rest;Sitting) Pulse (!) 102 Temp 98.5 °F (36.9 °C) Resp 19 SpO2 98% General: well developed, well nourished, pleasant , NAD. Hygiene good Psych: cooperative. Pleasant. No anxiety or depression. Normal mood and affect. Neuro: alert and oriented to person/place/situation. Otherwise nonfocal. 
Derm: Skin turgor normal for age, dry skin HEENT: Normocephalic, atraumatic. EOMI. Conjunctiva clear. No scleral icterus. Chest: Good air entry bilaterally. Respirations non labored Cardio[de-identified] Normal heart sounds,+ murmur Abdomen: Soft, nontender, nondistended, normoactive bowel sounds Lower extremities: color normal; temperature normal. Calves are supple, nontender. Focussed Lower Extremity Exam: 
Vascular exam: 
Bilateral lower extremity: moderate  edema, foot ,  
DP pulse : Bilateral, 2+ 
PT pulse: Bilateral, 2+ Venous ulcer right leg. Wound Description: Wound Length: (Clinically closed) Wound Width :  
 
Wound Depth :  
 
Etiology: venous stasis Ulcer bed: Mixed Granular/Fibrotic Periwound: Reddened Exudate: Scant/small amount Serosanguinous exudate Data Review: No results found for this or any previous visit (from the past 24 hour(s)). Assessment:  
 
Patient Active Problem List  
Diagnosis Code  Lymphedema I89.0  Chronic venous insufficiency I87.2  Intertrigo L30.4  Venous ulcer of right leg (HCC) I83.019, L97.919  
 
70 y.o. male with bilateral venous ulcer of  right leg Now closed Needs : 
Edema management Good Diabetic control Plan:  
Continue with farrow wraps. Refer to lymphedema clinic Signed By: Shabbir Jhaveri MD   
 April 24, 2019

## 2019-04-24 NOTE — WOUND CARE
04/24/19 1436 Wound Leg Lower Lateral;Right;Posterior Date First Assessed/Time First Assessed: 03/06/19 1513   Wound Type: Venous  Location: Leg Lower  Orientation: Lateral;Right;Posterior Dressing Status  Clean, dry, and intact Dressing Type  Absorptive; Unna boot Non-Pressure Injury Full thickness (subcut/muscle) Wound Length (cm)  
(Clinically closed) Condition of Base Pink Condition of Edges Open Tissue Type Pink Drainage Amount  None Wound Odor Musty Periwound Skin Condition Other (comment) (Dry, flaky skin) Cleansing and Cleansing Agents  Soap and water Dressing Type Applied Other (Comment) (Tubi- size G, Farrow wrap) Procedure Tolerated Well

## 2019-06-05 ENCOUNTER — HOSPITAL ENCOUNTER (OUTPATIENT)
Dept: WOUND CARE | Age: 79
Discharge: HOME | End: 2019-06-05
Payer: MEDICARE

## 2019-06-05 VITALS
SYSTOLIC BLOOD PRESSURE: 123 MMHG | RESPIRATION RATE: 18 BRPM | HEART RATE: 96 BPM | BODY MASS INDEX: 23.86 KG/M2 | DIASTOLIC BLOOD PRESSURE: 57 MMHG | TEMPERATURE: 98.2 F | WEIGHT: 180 LBS | OXYGEN SATURATION: 99 % | HEIGHT: 73 IN

## 2019-06-05 PROCEDURE — 29580 STRAPPING UNNA BOOT: CPT

## 2019-06-12 ENCOUNTER — HOSPITAL ENCOUNTER (OUTPATIENT)
Dept: WOUND CARE | Age: 79
Discharge: HOME | End: 2019-06-12
Payer: MEDICARE

## 2019-06-12 VITALS
TEMPERATURE: 98.4 F | OXYGEN SATURATION: 100 % | DIASTOLIC BLOOD PRESSURE: 56 MMHG | RESPIRATION RATE: 20 BRPM | SYSTOLIC BLOOD PRESSURE: 148 MMHG | HEART RATE: 77 BPM

## 2019-06-12 PROCEDURE — 99212 OFFICE O/P EST SF 10 MIN: CPT

## 2019-06-12 NOTE — WOUND CARE
06/12/19 1544 Wound Leg Anterior;Right Date First Assessed/Time First Assessed: 06/05/19 1330   Present on Hospital Admission: Yes  Primary Wound Type: Venous Ulcer  Location: Leg  Wound Location Orientation: Anterior;Right Dressing Status Clean, dry, and intact Dressing Type Unna boot Non-staged Wound Description Partial thickness Wound Length (cm) 0 cm Wound Width (cm) 0 cm Wound Depth (cm) 0 cm Wound Volume (cm^3) 0 cm^3 Condition of Base Pink Condition of Edges Closed Drainage Amount None Wound Odor None Cleansing and Cleansing Agents (foam wash) Dressing Changed Changed/New Dressing Type Applied ([facility])

## 2019-06-19 ENCOUNTER — HOSPITAL ENCOUNTER (OUTPATIENT)
Dept: WOUND CARE | Age: 79
Discharge: HOME | End: 2019-06-19
Payer: MEDICARE

## 2019-06-19 VITALS
SYSTOLIC BLOOD PRESSURE: 119 MMHG | RESPIRATION RATE: 20 BRPM | HEART RATE: 99 BPM | DIASTOLIC BLOOD PRESSURE: 61 MMHG | OXYGEN SATURATION: 100 % | TEMPERATURE: 98.4 F

## 2019-06-19 PROCEDURE — 29580 STRAPPING UNNA BOOT: CPT

## 2019-06-20 NOTE — WOUND CARE
[address] [facility] Follow up note. Chief Complaint: 
Geno Portillo is a 70 y.o.  male who presents with new open wound of his right leg  
 
 he has lymphedema for about 5 years. He has on and off venous ulcers. He was seen here previously for venous ulcer and it was healed after unna boots and compression stockings. He has not been compliant with wearing stockings and keeping his legs up. He reports that he was going to lymphedema clinic Review of systems General: No fevers or chills. Cardiovascular: No chest pain or pressure. No palpitations. Pulmonary: No shortness of breath. Gastrointestinal: No nausea, vomiting. Derm: See above Physical Exam:  
 
Visit Vitals /61 (BP 1 Location: Left arm, BP Patient Position: At rest;Sitting) Pulse 99 Temp 98.4 °F (36.9 °C) Resp 20 SpO2 100% General: well developed, well nourished, pleasant , NAD. Hygiene good Psych: cooperative. Pleasant. No anxiety or depression. Normal mood and affect. Neuro: alert and oriented to person/place/situation. Otherwise nonfocal. 
Derm: Skin turgor normal for age, dry skin HEENT: Normocephalic, atraumatic. EOMI. Conjunctiva clear. No scleral icterus. Chest: Good air entry bilaterally. Respirations non labored Cardio[de-identified] Normal heart sounds,+ murmur Abdomen: Soft, nontender, nondistended, normoactive bowel sounds Lower extremities: color normal; temperature normal. Calves are supple, nontender. Focussed Lower Extremity Exam: 
Vascular exam: 
Bilateral lower extremity: moderate  edema, foot ,  
DP pulse : Bilateral, 2+ 
PT pulse: Bilateral, 2+ Venous ulcer right leg. No open wounds Etiology: venous stasis Data Review: No results found for this or any previous visit (from the past 24 hour(s)). Assessment:  
 
Patient Active Problem List  
Diagnosis Code  Lymphedema I89.0  Chronic venous insufficiency I87.2  Intertrigo L30.4  Venous ulcer of right leg ([facility]) I83.019, L97.919  
 
70 y.o. male with bilateral venous ulcer of  right leg His wounds closed on last visit to [facility], He has not been compliant with his compression stockings Needs : 
Edema management Good Diabetic control Plan:  
Unna boot Compliance with compression stockings emphasized 
proshield Apply moisturizer to skin. PLEASE CONTACT OFFICE AS SOON AS POSSIBLE IF UNABLE TO MAKE THIS APPOINTMENT. Inspect your wounds, looking for signs of infection which may include the following:  Increase in redness  Red \"streaks\" from wound  Increase in swelling  Fever  Unusual odor  Change in the amount of wound drainage. Should you experience any significant changes in your wound(s) or have any questions regarding your home care instructions please contact the wound center or your home health company. If after regular business hours, please call your family doctor or local emergency room. Edema Control:   Elevate legs as much as possible. Avoid standing in one position for more than 10 minutes. Avoid setting with legs down. Do not cross legs while sitting. Signed By: Clark Latif MD   
 June 20, 2019

## 2019-06-20 NOTE — WOUND CARE
310 [facility] Follow up note. Chief Complaint: 
Rick Hay is a 70 y.o.  male who presents with new open wound of his right leg  
 
 he has lymphedema for about 5 years. He has on and off venous ulcers. He was seen here previously for venous ulcer and it was healed after unna boots and compression stockings. He has not been compliant with wearing stockings and keeping his legs up. He reports that he was going to lymphedema clinic Review of systems General: No fevers or chills. Cardiovascular: No chest pain or pressure. No palpitations. Pulmonary: No shortness of breath. Gastrointestinal: No nausea, vomiting. Derm: See above Physical Exam:  
 
Visit Vitals /57 (BP 1 Location: Left arm, BP Patient Position: Sitting) Pulse 96 Temp 98.2 °F (36.8 °C) Resp 18 Ht 6' 1\" (1.854 m) Wt 81.6 kg (180 lb) SpO2 99% BMI 23.75 kg/m² General: well developed, well nourished, pleasant , NAD. Hygiene good Psych: cooperative. Pleasant. No anxiety or depression. Normal mood and affect. Neuro: alert and oriented to person/place/situation. Otherwise nonfocal. 
Derm: Skin turgor normal for age, dry skin HEENT: Normocephalic, atraumatic. EOMI. Conjunctiva clear. No scleral icterus. Chest: Good air entry bilaterally. Respirations non labored Cardio[de-identified] Normal heart sounds,+ murmur Abdomen: Soft, nontender, nondistended, normoactive bowel sounds Lower extremities: color normal; temperature normal. Calves are supple, nontender. Focussed Lower Extremity Exam: 
Vascular exam: 
Bilateral lower extremity: moderate  edema, foot ,  
DP pulse : Bilateral, 2+ 
PT pulse: Bilateral, 2+ Venous ulcer right leg. Wound Description: Wound Length:   
 
Wound Width :  
 
Wound Depth :  
 
Etiology: venous stasis Data Review: No results found for this or any previous visit (from the past 24 hour(s)). Assessment:  
 
Patient Active Problem List  
Diagnosis Code  Lymphedema I89.0  Chronic venous insufficiency I87.2  Intertrigo L30.4  Venous ulcer of right leg ([facility]) I83.019, L97.919  
 
70 y.o. male with bilateral venous ulcer of  right leg Now closed Needs : 
Edema management Good Diabetic control Plan:  
Meera valdez In wound care clinic today: 
Cleanse wound with NS or soap and water or commercial wound cleanser Apply the following topically to wound bed:aquacel Apply the following to elizabeth-wound: NA Apply the following dressings: Absorptive dressing For Home Care/Self Care: 
Cleanse wound with NS or soap and water or commercial wound cleanser Keep dressing dry and intact when bathing Apply the following to wound bed:aqualcel Apply the following to skin around wound: NA Apply the following dressings: Absorptive dressing Leave dressings in place until next visit Patient to return for wound care in: 14  Days Follow up with Nurse visit as recommended. PLEASE CONTACT OFFICE AS SOON AS POSSIBLE IF UNABLE TO MAKE THIS APPOINTMENT. Inspect your wounds, looking for signs of infection which may include the following:  Increase in redness  Red \"streaks\" from wound  Increase in swelling  Fever  Unusual odor  Change in the amount of wound drainage. Should you experience any significant changes in your wound(s) or have any questions regarding your home care instructions please contact the wound center or your home health company. If after regular business hours, please call your family doctor or local emergency room. Edema Control:   Elevate legs as much as possible. Avoid standing in one position for more than 10 minutes. Avoid setting with legs down. Do not cross legs while sitting. Signed By: Kate Joel MD   
 June 20, 2019

## 2019-06-24 NOTE — WOUND CARE
06/19/19 1440 Wound Leg Anterior;Right Date First Assessed/Time First Assessed: 06/05/19 1330   Present on Hospital Admission: Yes  Primary Wound Type: Venous Ulcer  Location: Leg  Wound Location Orientation: Anterior;Right Dressing Status Intact; Old drainage Dressing Type Compression Wrap/Venous Stasis; Other (Comment) (farrow wraps) Non-staged Wound Description Partial thickness Wound Length (cm) 2.5 cm Wound Width (cm) 3 cm Wound Depth (cm) 0.2 cm Wound Volume (cm^3) 1.5 cm^3 Condition of Base Pink Condition of Edges Open Tissue Type Percent Red 80 Tissue Type Percent Yellow 20 Drainage Amount Moderate Drainage Color Clear Wound Odor Pungent Etta-wound Assessment Intact; Blanchable erythema; Excoriated; Maceration Margins Unattached edges Cleansing and Cleansing Agents  Other (comment) (Vashe wound cleanserf) Dressing Changed Changed/New Dressing Type Applied Unna boot; Other (Comment) (coban ) Procedure Tolerated Well  
 
 
 06/19/19 1440 Wound Leg Anterior;Right Date First Assessed/Time First Assessed: 06/05/19 1330   Present on Hospital Admission: Yes  Primary Wound Type: Venous Ulcer  Location: Leg  Wound Location Orientation: Anterior;Right Dressing Status Intact; Old drainage Dressing Type Compression Wrap/Venous Stasis; Other (Comment) (farrow wraps) Non-staged Wound Description Partial thickness Wound Length (cm) 2.5 cm Wound Width (cm) 3 cm Wound Depth (cm) 0.2 cm Wound Volume (cm^3) 1.5 cm^3 Condition of Base Pink Condition of Edges Open Tissue Type Percent Red 80 Tissue Type Percent Yellow 20 Drainage Amount Moderate Drainage Color Clear Wound Odor Pungent Etta-wound Assessment Intact; Blanchable erythema; Excoriated; Maceration Margins Unattached edges Cleansing and Cleansing Agents  Other (comment) (Vashe wound cleanserf) Dressing Changed Changed/New Dressing Type Applied Unna boot; Other (Comment) (coban ) Procedure Tolerated Well

## 2019-06-25 NOTE — DISCHARGE INSTRUCTIONS
For Home Care/Self Care  Frequency:  Cleanse wound with NS or soap and water or commercial wound cleanser  Patient may shower without dressing  Keep dressing dry and intact when bathing  Apply the following to wound bed:  Apply the following to skin around wound:  Apply the following dressings:    Leave dressings in place until next visit    Patient to return for wound care in: [address] IF UNABLE TO [facility]. Inspect your wounds, looking for signs of infection which may include the following:  Increase in redness  Red \"streaks\" from wound  Increase in swelling  Fever  Unusual odor  Change in the amount of wound drainage. Should you experience any significant changes in your wound(s) or have any questions regarding your home care instructions please contact the wound center or your home health company. If after regular business hours, please call your family doctor or local emergency room. Edema Control: Elevate legs as much as possible. Avoid standing in one position for more than 10 minutes. Avoid setting with legs down. Do not cross legs while sitting. Off-Loading:Frequent position changes. Do not cross legs while sitting. Shift weight every 20 minutes or more when sitting for prolonged periods of time. In wound care clinic today:  Cleanse wound with NS or soap and water or commercial wound cleanser  Apply the following topically to wound bed:  Apply the following to elizabeth-wound:  Apply the following dressings:    For Home Care/Self Care  Frequency:  Cleanse wound with NS or soap and water or commercial wound cleanser  Patient may shower without dressing  Keep dressing dry and intact when bathing  Apply the following to wound bed:  Apply the following to skin around wound:  Apply the following dressings:    Leave dressings in place until next visit    Patient to return for wound care in:  Days    PLEASE CONTACT OFFICE AS SOON AS POSSIBLE IF UNABLE TO MAKE THIS APPOINTMENT. Inspect your wounds, looking for signs of infection which may include the following:  Increase in redness  Red \"streaks\" from wound  Increase in swelling  Fever  Unusual odor  Change in the amount of wound drainage. Should you experience any significant changes in your wound(s) or have any questions regarding your home care instructions please contact the wound center or your home health company. If after regular business hours, please call your family doctor or local emergency room. Edema Control: Elevate legs as much as possible. Avoid standing in one position for more than 10 minutes. Avoid setting with legs down. Do not cross legs while sitting. Off-Loading:Frequent position changes. Do not cross legs while sitting.  Shift weight every 20 minutes or more when sitting for prolonged periods of time.          Collection Information     Verbal Order Info     Action Created on Order Mode Entered by Comment Responsible Provider Signed by Signed on   Ordering 06/25/19 0804 Verbal with readback BEN Huston MD     Additional Information     Associated Reports   View Encounter   Priority and Order Details   NPI Information        Electronically signed by Authorizing Provider: Marissa Brenner [id number]     Order start date/time: 6/25/2019 8:15 AM  Electronically signed by Co-signing Provider (if required):                 Order Report     Order Details

## 2019-06-26 ENCOUNTER — HOSPITAL ENCOUNTER (OUTPATIENT)
Dept: WOUND CARE | Age: 79
Discharge: HOME | End: 2019-06-26
Payer: MEDICARE

## 2019-06-26 VITALS
OXYGEN SATURATION: 99 % | SYSTOLIC BLOOD PRESSURE: 142 MMHG | RESPIRATION RATE: 18 BRPM | DIASTOLIC BLOOD PRESSURE: 55 MMHG | HEART RATE: 92 BPM | TEMPERATURE: 98.1 F

## 2019-06-26 PROCEDURE — 29580 STRAPPING UNNA BOOT: CPT

## 2019-06-26 NOTE — WOUND CARE
310 [facility] Follow up note. Chief Complaint: 
Addy Parra is a 70 y.o.  male who presents with new open wound of his right leg  
 
 he has lymphedema for about 5 years. He has on and off venous ulcers. He was seen here previously for venous ulcer and it was healed after unna boots and compression stockings. He has not been compliant with wearing stockings and keeping his legs up. He reports that he was going to lymphedema clinic Review of systems General: No fevers or chills. Cardiovascular: No chest pain or pressure. No palpitations. Pulmonary: No shortness of breath. Gastrointestinal: No nausea, vomiting. Derm: See above Physical Exam:  
 
Visit Vitals /55 (BP 1 Location: Left arm, BP Patient Position: Sitting) Pulse 92 Temp 98.1 °F (36.7 °C) Resp 18 SpO2 99% General: well developed, well nourished, pleasant , NAD. Hygiene good Psych: cooperative. Pleasant. No anxiety or depression. Normal mood and affect. Neuro: alert and oriented to person/place/situation. Otherwise nonfocal. 
Derm: Skin turgor normal for age, dry skin HEENT: Normocephalic, atraumatic. EOMI. Conjunctiva clear. No scleral icterus. Chest: Good air entry bilaterally. Respirations non labored Cardio[de-identified] Normal heart sounds,+ murmur Abdomen: Soft, nontender, nondistended, normoactive bowel sounds Lower extremities: color normal; temperature normal. Calves are supple, nontender. Focussed Lower Extremity Exam: 
Vascular exam: 
Bilateral lower extremity: moderate  edema, foot ,  
DP pulse : Bilateral, 2+ 
PT pulse: Bilateral, 2+ Venous ulcer right leg. No open wounds Etiology: venous stasis Data Review: No results found for this or any previous visit (from the past 24 hour(s)). Assessment:  
 
Patient Active Problem List  
Diagnosis Code  Lymphedema I89.0  Chronic venous insufficiency I87.2  Intertrigo L30.4  Venous ulcer of right leg ([facility]) I83.019, L97.919  
 
70 y.o. male with bilateral venous ulcer of  right leg Needs : 
Edema management Good Diabetic control Plan:  
Meera valdez Apply moisturizer to skin. PLEASE CONTACT OFFICE AS SOON AS POSSIBLE IF UNABLE TO MAKE THIS APPOINTMENT. Inspect your wounds, looking for signs of infection which may include the following:  Increase in redness  Red \"streaks\" from wound  Increase in swelling  Fever  Unusual odor  Change in the amount of wound drainage. Should you experience any significant changes in your wound(s) or have any questions regarding your home care instructions please contact the wound center or your home health company. If after regular business hours, please call your family doctor or local emergency room. Edema Control:   Elevate legs as much as possible. Avoid standing in one position for more than 10 minutes. Avoid setting with legs down. Do not cross legs while sitting. Signed By: Dilia Nichole MD   
 June 26, 2019

## 2019-07-03 ENCOUNTER — HOSPITAL ENCOUNTER (OUTPATIENT)
Dept: WOUND CARE | Age: 79
Discharge: HOME | End: 2019-07-03
Attending: HOSPITALIST
Payer: MEDICARE

## 2019-07-03 VITALS
RESPIRATION RATE: 18 BRPM | TEMPERATURE: 97.3 F | OXYGEN SATURATION: 98 % | DIASTOLIC BLOOD PRESSURE: 62 MMHG | HEART RATE: 92 BPM | SYSTOLIC BLOOD PRESSURE: 133 MMHG

## 2019-07-03 PROCEDURE — 29580 STRAPPING UNNA BOOT: CPT

## 2019-07-03 NOTE — WOUND CARE
07/03/19 1550 Wound Leg Anterior;Right Date First Assessed/Time First Assessed: 06/05/19 1330   Present on Hospital Admission: Yes  Primary Wound Type: Venous Ulcer  Location: Leg  Wound Location Orientation: Anterior;Right Dressing Status Clean, dry, and intact Wound Length (cm)  
(small scattered areas observed) Tissue Type Percent Pink 100 Drainage Amount None Drainage Color Clear Etta-wound Assessment Blanchable erythema 
(dry, flaky) Cleansing and Cleansing Agents (foam wash and barrier wipes) Dressing Changed Changed/New Dressing Type Applied Unna boot 
(proshield ) Procedure Tolerated Well Wound Leg lower Left 06/05/19 Date First Assessed/Time First Assessed: 06/05/19 1350   Present on Hospital Admission: Yes  Primary Wound Type: Venous Ulcer  Location: Leg lower  Wound Location Orientation: Left  Date of First Observation: 06/05/19 Dressing Status Clean, dry, and intact Dressing Type Unna boot Wound Length (cm)  
(small scattered open areas observed) Condition of Base Pink Condition of Edges Open Tissue Type Percent Pink 100 Drainage Amount Scant Drainage Color Serous Etta-wound Assessment Blanchable erythema Cleansing and Cleansing Agents (foam wash and barrier wipes) Dressing Changed Changed/New Dressing Type Applied Unna boot 
(proshield) Procedure Tolerated Well

## 2019-07-03 NOTE — DISCHARGE INSTRUCTIONS
Keep dressing dry and intact when bathing    Leave dressings in place until next visit    Patient to return for wound care in:  1 week to see Dr. Corin Manuel and for dressing change, possible discharge

## 2019-07-10 ENCOUNTER — HOSPITAL ENCOUNTER (OUTPATIENT)
Dept: WOUND CARE | Age: 79
Discharge: HOME | End: 2019-07-10
Attending: HOSPITALIST
Payer: MEDICARE

## 2019-07-10 VITALS
DIASTOLIC BLOOD PRESSURE: 60 MMHG | TEMPERATURE: 97.7 F | RESPIRATION RATE: 18 BRPM | OXYGEN SATURATION: 99 % | SYSTOLIC BLOOD PRESSURE: 124 MMHG | HEART RATE: 90 BPM

## 2019-07-10 PROCEDURE — 99214 OFFICE O/P EST MOD 30 MIN: CPT

## 2019-07-10 NOTE — WOUND CARE
07/10/19 1458 Wound Leg Anterior;Right Date First Assessed/Time First Assessed: 06/05/19 1330   Present on Hospital Admission: Yes  Primary Wound Type: Venous Ulcer  Location: Leg  Wound Location Orientation: Anterior;Right Dressing Status Clean, dry, and intact Dressing Type Unna boot Wound Length (cm)  
(no open areas ) Epithelialization (%) 100 Drainage Amount None Wound Odor None Etta-wound Assessment Blanchable erythema Cleansing and Cleansing Agents (foam wash and barrier wipes) Dressing Changed Changed/New Dressing Type Applied (Farrow wrap) Wound Leg lower Left 06/05/19 Date First Assessed/Time First Assessed: 06/05/19 1350   Present on Hospital Admission: Yes  Primary Wound Type: Venous Ulcer  Location: Leg lower  Wound Location Orientation: Left  Date of First Observation: 06/05/19 Dressing Status Clean, dry, and intact Dressing Type Unna boot Wound Length (cm)  
(no open areas) Epithelialization (%) 100 Drainage Amount None Etta-wound Assessment Blanchable erythema Cleansing and Cleansing Agents (foam wash and barrier wipes) Dressing Changed Changed/New Dressing Type Applied (farrow wrap) Procedure Tolerated Well

## 2019-07-25 ENCOUNTER — APPOINTMENT (OUTPATIENT)
Dept: VASCULAR SURGERY | Age: 79
End: 2019-07-25
Attending: PHYSICIAN ASSISTANT
Payer: MEDICARE

## 2019-07-25 ENCOUNTER — HOSPITAL ENCOUNTER (EMERGENCY)
Age: 79
Discharge: HOME | End: 2019-07-25
Attending: EMERGENCY MEDICINE
Payer: MEDICARE

## 2019-07-25 ENCOUNTER — HOSPITAL ENCOUNTER (EMERGENCY)
Age: 79
Discharge: STILL ADMITTED OR ADMINISTRATIVE | End: 2019-07-25
Attending: EMERGENCY MEDICINE
Payer: MEDICARE

## 2019-07-25 VITALS
OXYGEN SATURATION: 96 % | BODY MASS INDEX: 23.75 KG/M2 | RESPIRATION RATE: 18 BRPM | HEART RATE: 92 BPM | DIASTOLIC BLOOD PRESSURE: 71 MMHG | SYSTOLIC BLOOD PRESSURE: 144 MMHG | TEMPERATURE: 98.7 F | HEIGHT: 73 IN

## 2019-07-25 DIAGNOSIS — L03.115 CELLULITIS OF RIGHT LEG: Primary | ICD-10-CM

## 2019-07-25 DIAGNOSIS — I89.0 LYMPHEDEMA OF BOTH LOWER EXTREMITIES: ICD-10-CM

## 2019-07-25 LAB
ALBUMIN SERPL-MCNC: 2.6 G/DL (ref 3.4–5)
ALBUMIN/GLOB SERPL: 0.6 {RATIO} (ref 0.8–1.7)
ALP SERPL-CCNC: 95 U/L (ref 45–117)
ALT SERPL-CCNC: 13 U/L (ref 16–61)
ANION GAP SERPL CALC-SCNC: 7 MMOL/L (ref 3–18)
AST SERPL-CCNC: 19 U/L (ref 10–38)
BASOPHILS # BLD: 0 K/UL (ref 0–0.1)
BASOPHILS NFR BLD: 0 % (ref 0–2)
BILIRUB SERPL-MCNC: 0.4 MG/DL (ref 0.2–1)
BUN SERPL-MCNC: 18 MG/DL (ref 7–18)
BUN/CREAT SERPL: 15 (ref 12–20)
CALCIUM SERPL-MCNC: 8.2 MG/DL (ref 8.5–10.1)
CHLORIDE SERPL-SCNC: 105 MMOL/L (ref 100–111)
CO2 SERPL-SCNC: 26 MMOL/L (ref 21–32)
CREAT SERPL-MCNC: 1.2 MG/DL (ref 0.6–1.3)
DIFFERENTIAL METHOD BLD: ABNORMAL
EOSINOPHIL # BLD: 0.1 K/UL (ref 0–0.4)
EOSINOPHIL NFR BLD: 1 % (ref 0–5)
ERYTHROCYTE [DISTWIDTH] IN BLOOD BY AUTOMATED COUNT: 16.5 % (ref 11.6–14.5)
EST. AVERAGE GLUCOSE BLD GHB EST-MCNC: 160 MG/DL
GLOBULIN SER CALC-MCNC: 4.1 G/DL (ref 2–4)
GLUCOSE SERPL-MCNC: 224 MG/DL (ref 74–99)
HBA1C MFR BLD: 7.2 % (ref 4.2–5.6)
HCT VFR BLD AUTO: 32.9 % (ref 36–48)
HGB BLD-MCNC: 10.8 G/DL (ref 13–16)
LYMPHOCYTES # BLD: 1.1 K/UL (ref 0.9–3.6)
LYMPHOCYTES NFR BLD: 13 % (ref 21–52)
MCH RBC QN AUTO: 26.3 PG (ref 24–34)
MCHC RBC AUTO-ENTMCNC: 32.8 G/DL (ref 31–37)
MCV RBC AUTO: 80.2 FL (ref 74–97)
MONOCYTES # BLD: 0.7 K/UL (ref 0.05–1.2)
MONOCYTES NFR BLD: 9 % (ref 3–10)
NEUTS SEG # BLD: 6.5 K/UL (ref 1.8–8)
NEUTS SEG NFR BLD: 77 % (ref 40–73)
PLATELET # BLD AUTO: 333 K/UL (ref 135–420)
PMV BLD AUTO: 9.1 FL (ref 9.2–11.8)
POTASSIUM SERPL-SCNC: 4.3 MMOL/L (ref 3.5–5.5)
PROT SERPL-MCNC: 6.7 G/DL (ref 6.4–8.2)
RBC # BLD AUTO: 4.1 M/UL (ref 4.7–5.5)
SODIUM SERPL-SCNC: 138 MMOL/L (ref 136–145)
WBC # BLD AUTO: 8.4 K/UL (ref 4.6–13.2)

## 2019-07-25 PROCEDURE — 83036 HEMOGLOBIN GLYCOSYLATED A1C: CPT

## 2019-07-25 PROCEDURE — 96374 THER/PROPH/DIAG INJ IV PUSH: CPT

## 2019-07-25 PROCEDURE — 75810000275 HC EMERGENCY DEPT VISIT NO LEVEL OF CARE

## 2019-07-25 PROCEDURE — 93971 EXTREMITY STUDY: CPT

## 2019-07-25 PROCEDURE — 85025 COMPLETE CBC W/AUTO DIFF WBC: CPT

## 2019-07-25 PROCEDURE — 80053 COMPREHEN METABOLIC PANEL: CPT

## 2019-07-25 PROCEDURE — 29580 STRAPPING UNNA BOOT: CPT

## 2019-07-25 PROCEDURE — 74011250636 HC RX REV CODE- 250/636: Performed by: PHYSICIAN ASSISTANT

## 2019-07-25 PROCEDURE — 99282 EMERGENCY DEPT VISIT SF MDM: CPT

## 2019-07-25 RX ORDER — CLINDAMYCIN HYDROCHLORIDE 150 MG/1
300 CAPSULE ORAL EVERY 6 HOURS
Qty: 80 CAP | Refills: 0 | Status: SHIPPED | OUTPATIENT
Start: 2019-07-25 | End: 2019-08-04

## 2019-07-25 RX ORDER — CLINDAMYCIN PHOSPHATE 900 MG/50ML
900 INJECTION, SOLUTION INTRAVENOUS
Status: COMPLETED | OUTPATIENT
Start: 2019-07-25 | End: 2019-07-25

## 2019-07-25 RX ADMIN — CLINDAMYCIN PHOSPHATE 900 MG: 900 INJECTION, SOLUTION INTRAVENOUS at 12:42

## 2019-07-25 NOTE — ED NOTES
I have reviewed discharge instructions with the patient. The patient verbalized understanding.  vss stable, pt agrees to follow up with woundcare in one week, is compliant with discharge instructions

## 2019-07-25 NOTE — WOUND CARE
Pt seen by wound care. Bilateral legs reddened and swollen. Intact blisters observed to left leg and ruptured blister observed to right leg. Legs cleansed with soap, water and barrier wipes. Absorptive dressings aapplied to legs followed by unna boots and coban from mpj to popliteal space in a compressive manner.   Pt given a follow up appointment in wound clinic on 7/31/2019 at 3:45 pm.

## 2019-07-25 NOTE — DISCHARGE INSTRUCTIONS
Patient Education        Cellulitis: Care Instructions  Your Care Instructions    Cellulitis is a skin infection caused by bacteria, most often strep or staph. It often occurs after a break in the skin from a scrape, cut, bite, or puncture, or after a rash. Cellulitis may be treated without doing tests to find out what caused it. But your doctor may do tests, if needed, to look for a specific bacteria, like methicillin-resistant Staphylococcus aureus (MRSA). The doctor has checked you carefully, but problems can develop later. If you notice any problems or new symptoms, get medical treatment right away. Follow-up care is a key part of your treatment and safety. Be sure to make and go to all appointments, and call your doctor if you are having problems. It's also a good idea to know your test results and keep a list of the medicines you take. How can you care for yourself at home? · Take your antibiotics as directed. Do not stop taking them just because you feel better. You need to take the full course of antibiotics. · Prop up the infected area on pillows to reduce pain and swelling. Try to keep the area above the level of your heart as often as you can. · If your doctor told you how to care for your wound, follow your doctor's instructions. If you did not get instructions, follow this general advice:  ? Wash the wound with clean water 2 times a day. Don't use hydrogen peroxide or alcohol, which can slow healing. ? You may cover the wound with a thin layer of petroleum jelly, such as Vaseline, and a nonstick bandage. ? Apply more petroleum jelly and replace the bandage as needed. · Be safe with medicines. Take pain medicines exactly as directed. ? If the doctor gave you a prescription medicine for pain, take it as prescribed. ? If you are not taking a prescription pain medicine, ask your doctor if you can take an over-the-counter medicine.   To prevent cellulitis in the future  · Try to prevent cuts, scrapes, or other injuries to your skin. Cellulitis most often occurs where there is a break in the skin. · If you get a scrape, cut, mild burn, or bite, wash the wound with clean water as soon as you can to help avoid infection. Don't use hydrogen peroxide or alcohol, which can slow healing. · If you have swelling in your legs (edema), support stockings and good skin care may help prevent leg sores and cellulitis. · Take care of your feet, especially if you have diabetes or other conditions that increase the risk of infection. Wear shoes and socks. Do not go barefoot. If you have athlete's foot or other skin problems on your feet, talk to your doctor about how to treat them. When should you call for help? Call your doctor now or seek immediate medical care if:    · You have signs that your infection is getting worse, such as:  ? Increased pain, swelling, warmth, or redness. ? Red streaks leading from the area. ? Pus draining from the area. ? A fever.     · You get a rash.    Watch closely for changes in your health, and be sure to contact your doctor if:    · You do not get better as expected. Where can you learn more? Go to http://miguel-sugey.info/. [place] [address] in the search box to learn more about \"Cellulitis: Care Instructions. \"  Current as of: April 1, 2019  Content Version: 12.1  © 5450-8506 [employer]. Care instructions adapted under license by [employer] (which disclaims liability or warranty for this information). If you have questions about a medical condition or this instruction, always ask your healthcare professional. [place] [address] any warranty or liability for your use of this information. Patient Education        Lymphedema: Care Instructions  Your Care Instructions    Lymphedema is fluid that builds up in the arms or legs.  It is often caused by surgery to remove lymph nodes during cancer treatment, especially breast cancer surgery, which can cause fluid to build up in the arm. It can happen after radiation treatment to an area that involves lymph nodes. It also can be caused by a fractured bone or surgery to fix a fracture. And some medicines also can cause lymphedema. Some people get it for unknown reasons. Normally, lymph nodes trap bacteria and other substances as fluid flows through them. Then, the white cells in the body's defense, or immune, system can destroy the substances. But if there are few or no lymph nodes--or if the lymph system in an arm or leg has been damaged--fluid can build up in the affected arm or leg. You can take simple steps at home to help treat or prevent fluid buildup. Treatment may include raising the arm or leg to let gravity drain the fluid. You also can wear compression stockings or sleeves. Follow-up care is a key part of your treatment and safety. Be sure to make and go to all appointments, and call your doctor if you are having problems. It's also a good idea to know your test results and keep a list of the medicines you take. How can you care for yourself at home? · Wear a compression stocking or sleeve as your doctor suggests. It can help keep fluid from pooling in an arm or leg. Wear it during air travel. · Prop up the swollen arm or leg on a pillow anytime you sit or lie down. Try to keep it above the level of your heart. This will help reduce swelling. · Avoid crossing your legs if your legs are swollen. · Get some exercise on most days of the week. Increase the intensity of exercise slowly. Water aerobics can help reduce swelling by helping fluid move around. Wear your compression stocking or sleeve during exercise, but not during water exercise. · See a physical therapist. He or she can teach you how to do self-massage to help fluid move around. You also can learn what activities would be best for you. · Keep your feet clean and wear clean socks or stockings every day.  Check your feet often for signs of infection, such as redness or heat. Do not walk barefoot. · If you have had lymph nodes removed from under your arm:  ? Do not have blood drawn from the arm on the side of the lymph node surgery. ? Do not allow a blood pressure cuff to be placed on that arm. If you are in the hospital, make sure your nurse and other hospital staff know of your condition. ? Wear gloves when gardening or doing other activities that may lead to cuts on your fingers or hands. · If you have had lymph nodes removed from your groin:  ? Bathe your feet daily in lukewarm, not hot, water. Use a mild soap that has a moisturizer, or use a moisturizer separately. ? Check your feet for blisters or cuts. ? Wear comfortable and supportive shoes that fit properly. ? Wear the correct size of panty hose and stockings. Avoid garters or knee-high or thigh-high stockings. · Ask your doctor how to treat any cuts, scratches, insect bites, or other injuries that may occur. · Use sunscreen and insect repellent when outdoors to protect your skin from sunburn and insect bites. · Wear medical alert jewelry that says you have lymphedema. You can buy these at most drugstores and on the Internet. When should you call for help? Call your doctor now or seek immediate medical care if:    · You have signs of infection, such as:  ? Increased pain, swelling, warmth, or redness. ? Red streaks leading from the area. ? Pus draining from the area. ? A fever.    Watch closely for changes in your health, and be sure to contact your doctor if:    · You have new or worse symptoms from lymphedema.     · You do not get better as expected. Where can you learn more? Go to http://miguel-sugey.info/. Enter V398 in the search box to learn more about \"Lymphedema: Care Instructions. \"  Current as of: December 19, 2018  Content Version: 12.1  © 9742-6823 [employer].  Care instructions adapted under license by [employer] (which disclaims liability or warranty for this information). If you have questions about a medical condition or this instruction, always ask your healthcare professional. [address] any warranty or liability for your use of this information.

## 2019-07-25 NOTE — ED PROVIDER NOTES
EMERGENCY DEPARTMENT HISTORY AND PHYSICAL EXAM    Date: 7/25/2019  Patient Name: Lidia Alicea    History of Presenting Illness     Chief Complaint   Patient presents with    Wound Check         History Provided By: Patient    Chief Complaint: wound check    HPI(Context):   11:44 AM  Lidia Alicea is a 70 y.o. male with PMHX of chronic lymphedema of BLE, DMII, aortic stenosis, anemia who presents to the emergency department for wound check. Reports redness and wound to RLE. Sxs x few days. Followed by [facility] wound clinic for same. Recently discharged as wound healed but returned soon after. He reports more swelling, pain and redness to RLE over last few days. Pt denies chest pain, SOB, numbness, weakness, and any other sxs or complaints. PCP: Lemuel Phillips MD        Past History     Past Medical History:  Past Medical History:   Diagnosis Date    Anemia     Aortic stenosis     At risk for falls     DM (diabetes mellitus) ([facility]     GI bleed     Lymphedema     Pressure ulcer of contiguous region involving back and right buttock, stage 2 12/4/2018       Past Surgical History:  History reviewed. No pertinent surgical history. Family History:  History reviewed. No pertinent family history. Social History:  Social History     Tobacco Use    Smoking status: Never Smoker    Smokeless tobacco: Never Used   Substance Use Topics    Alcohol use: Not on file    Drug use: Not on file       Allergies: Allergies   Allergen Reactions    Pcn [Penicillins] Hives, Shortness of Breath and Itching         Review of Systems   Review of Systems   Constitutional: Negative for chills and fever. Respiratory: Negative for cough and shortness of breath. Cardiovascular: Positive for leg swelling. Negative for chest pain. Gastrointestinal: Negative for nausea and vomiting. Skin: Positive for color change (erythema to RLE) and wound (recurrent wound to RLE). Neurological: Negative for weakness and numbness. All other systems reviewed and are negative. Physical Exam     Vitals:    07/25/19 1138 07/25/19 1431   BP: 144/71    Pulse: (!) 102 92   Resp: 20 18   Temp: 98.7 °F (37.1 °C)    SpO2: 100% 96%   Weight: (P) 74.8 kg (165 lb)    Height: 6' 1\" (1.854 m)      Physical Exam   Constitutional: He is oriented to person, place, and time. He appears well-developed and well-nourished. No distress. Obese male in NAD. Ambulatory with cane (baseline)   HENT:   Head: Normocephalic and atraumatic. Right Ear: External ear normal.   Left Ear: External ear normal.   Eyes: Conjunctivae are normal.   Neck: Normal range of motion. Cardiovascular: Normal rate, regular rhythm, normal heart sounds and intact distal pulses. Exam reveals no gallop and no friction rub. No murmur heard. Pulses:       Dorsalis pedis pulses are 1+ on the right side, and 1+ on the left side. Posterior tibial pulses are 1+ on the right side, and 1+ on the left side. Pulmonary/Chest: Effort normal and breath sounds normal. No accessory muscle usage. No tachypnea. No respiratory distress. He has no decreased breath sounds. He has no wheezes. He has no rhonchi. He has no rales. Musculoskeletal:   Extensive lymphedema to BLE. R>>L. Tenderness and erythema to RLE at calf    Neurological: He is alert and oriented to person, place, and time. Skin: Skin is warm and dry. He is not diaphoretic. Psychiatric: He has a normal mood and affect. Judgment normal.   Nursing note and vitals reviewed. Diagnostic Study Results     Labs -   No results found for this or any previous visit (from the past 12 hour(s)). No orders to display     CT Results  (Last 48 hours)    None        CXR Results  (Last 48 hours)    None        DUPLEX US RLE    · No evidence of deep vein thrombosis in the right lower extremity veins assessed and contralateral common femoral vein.       Lower Extremity Venous Findings     Right Lower Venous     No evidence of deep vein thrombosis in the right lower extremity. The common femoral, profunda femoral, femoral, popliteal and saphenous femoral junction vein(s) were imaged in the transverse and longitudinal planes. The vessels showed normal color filling and compressibility. Doppler interrogation of the veins showed phasic and spontaneous flow. The popliteal, posterior tibial and peroneal vein(s) were not well visualized. Patency of right popliteal vein is only documented with the color flow and doppler signal.   Left Lower Venous     The common femoral vein(s) were imaged in the transverse and longitudinal planes. The vessels showed normal color filling and compressibility. Doppler interrogation of the veins showed phasic and spontaneous flow. Medications given in the ED-  Medications   clindamycin (CLEOCIN) 900mg NS 50mL IVPB (premix) (900 mg IntraVENous Given 7/25/19 1242)         Medical Decision Making   I am the first provider for this patient. I reviewed the vital signs, available nursing notes, past medical history, past surgical history, family history and social history. Vital Signs-Reviewed the patient's vital signs. Pulse Oximetry Analysis - 96% on RA     Records Reviewed: Nursing Notes, Old Medical Records, Previous Radiology Studies and Previous Laboratory Studies    Provider Notes (Medical Decision Making): cellulitis, chronic venous ulcer, abscess, lymphedema, DVT    Procedures:  Procedures    ED Course:   11:44 AM Initial assessment performed. The patients presenting problems have been discussed, and they are in agreement with the care plan formulated and outlined with them. I have encouraged them to ask questions as they arise throughout their visit. Diagnosis and Disposition       Duplex negative for DVT. Wound care has seen patient in ED. Wrapped RLE. Will see patient in clinic. Will place on ABX with IV clinda given in ED. No evidence of deep or systemic process.  Reasons to RTED discussed with pt. All questions answered. Pt feels comfortable going home at this time. Pt expressed understanding and he agrees with plan. 1. Cellulitis of right leg    2. Lymphedema of both lower extremities        PLAN:  1. D/C Home  2. Discharge Medication List as of 7/25/2019  2:05 PM      START taking these medications    Details   clindamycin (CLEOCIN) 150 mg capsule Take 2 Caps by mouth every six (6) hours for 10 days. Indications: skin infection, Print, Disp-80 Cap, R-0           3. Follow-up Information     Follow up With Specialties Details Why Contact Info Additional Information    Alden Segura [id number] [phone] Patients scheduled for OP Wound Care visits should enter the [facility], 2nd floor, [address] for check in. [facility] EMERGENCY DEPT Emergency Medicine  As needed, If symptoms worsen [address] [address]  [phone]         _______________________________    Attestations: This note is prepared by Daniel Barreto PA-C.  _______________________________          Please note that this dictation was completed with Neura, the computer voice recognition software. Quite often unanticipated grammatical, syntax, homophones, and other interpretive errors are inadvertently transcribed by the computer software. Please disregard these errors. Please excuse any errors that have escaped final proofreading.

## 2019-07-31 ENCOUNTER — HOSPITAL ENCOUNTER (OUTPATIENT)
Dept: WOUND CARE | Age: 79
Discharge: HOME | End: 2019-07-31
Attending: HOSPITALIST
Payer: MEDICARE

## 2019-07-31 VITALS
OXYGEN SATURATION: 98 % | RESPIRATION RATE: 18 BRPM | HEART RATE: 91 BPM | SYSTOLIC BLOOD PRESSURE: 138 MMHG | TEMPERATURE: 97.5 F | DIASTOLIC BLOOD PRESSURE: 59 MMHG

## 2019-07-31 PROCEDURE — 29580 STRAPPING UNNA BOOT: CPT

## 2019-07-31 NOTE — DISCHARGE INSTRUCTIONS
For Home Care/Self Care     Keep dressing dry and intact when bathing       Leave dressings in place until next visit

## 2019-07-31 NOTE — WOUND CARE
[facility] Follow up note. Chief Complaint: 
Sara Dean is a 70 y.o.  male who presents with new open wound of his right leg  
 
 he has lymphedema for about 5 years. He has on and off venous ulcers. He was seen here previously for venous ulcer and it was healed after unna boots and compression stockings. He has not been compliant with wearing stockings and keeping his legs up. Review of systems General: No fevers or chills. Cardiovascular: No chest pain or pressure. No palpitations. Pulmonary: No shortness of breath. Gastrointestinal: No nausea, vomiting. Derm: See above Physical Exam:  
 
Visit Vitals /59 (BP 1 Location: Left arm, BP Patient Position: Sitting) Pulse 91 Temp 97.5 °F (36.4 °C) Resp 18 SpO2 98% General: well developed, well nourished, pleasant , NAD. Hygiene good Psych: cooperative. Pleasant. No anxiety or depression. Normal mood and affect. Neuro: alert and oriented to person/place/situation. Otherwise nonfocal. 
Derm: Skin turgor normal for age, dry skin HEENT: Normocephalic, atraumatic. EOMI. Conjunctiva clear. No scleral icterus. Chest: Good air entry bilaterally. Respirations non labored Cardio[de-identified] Normal heart sounds,+ murmur Abdomen: Soft, nontender, nondistended, normoactive bowel sounds Lower extremities: color normal; temperature normal. Calves are supple, nontender. Focussed Lower Extremity Exam: 
Vascular exam: 
Bilateral lower extremity: moderate  edema, foot ,  
DP pulse : Bilateral, 2+ 
PT pulse: Bilateral, 2+ Venous ulcer right leg. No open wounds Etiology: venous stasis Data Review: No results found for this or any previous visit (from the past 24 hour(s)). Assessment:  
 
Patient Active Problem List  
Diagnosis Code  Lymphedema I89.0  Chronic venous insufficiency I87.2  Intertrigo L30.4  Ven[facility] I83.019, L97.910  
 
 70 y.o. male with bilateral venous ulcer of  right leg Needs : 
Edema management Good Diabetic control Plan: In wound care clinic today: 
Cleanse wound with NS or soap and water or commercial wound cleanser Apply the following topically to wound bed: aquacel ag Apply the following to elizabeth-wound: proshield, Apply the following dressings: exudry, brendan and unna boots to bilateral legs For Home Care/Self Care 
  
Keep dressing dry and intact when bathing 
  
 
Leave dressings in place until next visit Patient to return for wound care in: 1 week for nurse assessment and dressing change.  Nurse to apply proshield to periwound followed by  aquacel ag to open areas followed by absorptive dressings and unna boots.  Pt to return to clinic in 2 weeks to see Dr. Kristen Michelle. PLEASE CONTACT OFFICE AS SOON AS POSSIBLE IF UNABLE TO MAKE THIS APPOINTMENT. Inspect your wounds, looking for signs of infection which may include the following:  Increase in redness  Red \"streaks\" from wound  Increase in swelling  Fever  Unusual odor  Change in the amount of wound drainage. Should you experience any significant changes in your wound(s) or have any questions regarding your home care instructions please contact the wound center or your home health company. If after regular business hours, please call your family doctor or local emergency room. Edema Control: Elevate legs as much as possible. Avoid standing in one position for more than 10 minutes. Avoid setting with legs down. Do not cross legs while sitting. Off-Loading:Frequent position changes. Do not cross legs while sitting. Shift weight every 20 minutes or more when sitting for prolonged periods of time. Signed By: Vinay Adan MD   
 July 31, 2019

## 2019-08-09 ENCOUNTER — HOSPITAL ENCOUNTER (OUTPATIENT)
Dept: WOUND CARE | Age: 79
Discharge: HOME | End: 2019-08-09
Payer: MEDICARE

## 2019-08-09 VITALS
SYSTOLIC BLOOD PRESSURE: 151 MMHG | HEART RATE: 97 BPM | DIASTOLIC BLOOD PRESSURE: 64 MMHG | TEMPERATURE: 97.9 F | OXYGEN SATURATION: 98 % | RESPIRATION RATE: 18 BRPM

## 2019-08-09 PROCEDURE — 29580 STRAPPING UNNA BOOT: CPT

## 2019-08-09 NOTE — WOUND CARE
08/09/19 1414 Wound Leg Left; Anterior Date First Assessed/Time First Assessed: 07/31/19 1400   Present on Hospital Admission: Yes  Primary Wound Type: Blister/bullae  Location: Leg  Wound Location Orientation: Left; Anterior Dressing Status Intact; Old drainage Dressing Type Absorptive; Unna boot Non-staged Wound Description Full thickness Wound Length (cm) 0 cm Wound Width (cm) 0 cm Wound Depth (cm) 0 cm Wound Volume (cm^3) 0 cm^3 Condition of Base Pink Condition of Edges Closed Assessment Pink; Other (Comment) (scabbed) Drainage Amount Scant Drainage Color Serosanguinous Wound Odor Musty Etta-wound Assessment Edema Cleansing and Cleansing Agents  Soap and water;Dermal wound cleanser Dressing Changed Changed/New Dressing Type Applied Other (Comment) (unna boot, coban) Procedure Tolerated Well Wound Leg Lateral;Right Date First Assessed/Time First Assessed: 07/31/19 1400   Present on Hospital Admission: Yes  Primary Wound Type: Blister/bullae  Location: Leg  Wound Location Orientation: Lateral;Right Dressing Status Intact; Old drainage Dressing Type Absorptive; Unna boot Wound Length (cm)  
(multiple scattered areas) Wound Depth (cm) 0.1 cm Condition of Base Pink Condition of Edges Open Assessment Edema;Drainage Drainage Amount Small Drainage Color Serosanguinous; Tan  
Wound Odor Musty Etta-wound Assessment Edema;Blanchable erythema Cleansing and Cleansing Agents  Dermal wound cleanser; Soap and water Dressing Changed Changed/New Dressing Type Applied Other (Comment) (Aquacel Ag, Unna boot, coban) Procedure Tolerated Well Camera malfunction-unable to attach photo

## 2019-08-14 ENCOUNTER — HOSPITAL ENCOUNTER (OUTPATIENT)
Dept: WOUND CARE | Age: 79
Discharge: HOME | End: 2019-08-14
Payer: MEDICARE

## 2019-08-14 VITALS
HEART RATE: 79 BPM | OXYGEN SATURATION: 99 % | SYSTOLIC BLOOD PRESSURE: 132 MMHG | TEMPERATURE: 98.1 F | DIASTOLIC BLOOD PRESSURE: 60 MMHG

## 2019-08-14 PROCEDURE — 29580 STRAPPING UNNA BOOT: CPT

## 2019-08-14 NOTE — DISCHARGE INSTRUCTIONS
Discharge Instructions for  [address]  [address] [place], [facility]  [phone] Fax [phone]    NAME:  Ruddy Buckley  YOB: 1940  MEDICAL RECORD NUMBER:  [id number]  DATE:  8/14/2019    Wound Cleansing:   Do not scrub or use excessive force. Cleanse wound prior to applying a clean dressing with:  [] Normal Saline [] Keep Wound Dry in Shower    [] Wound Cleanser   [] Cleanse wound with Mild Soap & Water  [] May Shower at Discharge   [] Other:      Topical Treatments:  Do not apply lotions, creams, or ointments to wound bed unless directed. [x] Apply moisturizing lotion to skin surrounding the wound prior to dressing change.  [] Apply antifungal ointment to skin surrounding the wound prior to dressing change. [x] Apply thin film of moisture barrier ointment to skin immediately around wound. [x] Other:       Dressings:           Wound Location ***   [] Ath skin. [] Change dressing: [] Daily    [] Every Other Day [] Three times per week   [] Once a week [x] Do Not Change Dressing   [] Other:    Dressings:           Wound Location ***    [] Ap[] Change dressing: [] Daily    [] Every Other Day [] Three times per week   [] Once a week [x] Do Not Change Dressing   [] Other:     Pressure Relief:  [] Edema Control:  Apply: [] Compression Stocking [x]Right Leg [x]Left Leg   [] Tubigrip []Right Leg Double Layer []Left Leg Double Layer       []Right Leg Single Layer []Left Leg Single Layer   [] SpandaGrip []Right Leg  []Left Leg      []Low compression 5-10 mm/Hg      []Medium compression 10-20 mm/Hg     []High compression  20-30 mm/Hg   every morning immediately when getting up should be applied to affected leg(s) from mid foot to knee making sure to cover the heel. Remove every night before going to bed. [x] Elevate leg(s) above the level of the heart when sitting. [x] Avoid prolonged standing in one place.    [x] Elevate arm/hand above the level of the heart []RightArm []LeftArm     Compression:  Apply: [] Multilayer Compression Wrap Applied in Clinic [x]RightLeg [x]Left Leg   [x] Multi-layer compression. Do not get leg(s) with wrap wet. If wraps become too tight call the center or completely remove the wrap. [x] Elevate leg(s) above the level of the heart when sitting. [x] Avoid prolonged standing in one place. Off-Loading:   [] Off-loading when [x] walking  [] in bed [] sitting  [] Total non-weight bearing  [] Right Leg  [] Left Leg   [x] Assistive Device [] Walker [] Cane  [] Wheelchair  [] Crutches   [] Surgical shoe    [] Podus Boot(s)   [] Foam Boot(s)  [] Roll About    [] Cast Boot [] CROW Boot  [] Other:    Contact Cast:  Apply: [] Total Contact Cast Applied in Clinic []RightLeg []Left Leg   [] Do not get cast wet. Contact center or go to emergency room if there is a foul odor or becomes uncomfortable due to feeling tight or swelling. Do not use objects inside of cast to scratch. Dietary:  [x] Diet as tolerated: [] Calorie Diabetic Diet: [] No Added Salt:  [] Increase Protein: [] Other:   Activity:  [x] Activity as tolerated:  [] Patient has no activity restrictions     [] Strict Bedrest: [] Remain off Work:     [] May return to full duty work:                                   [] Return to work with restrictions:             Return Appointment:  [x] Wound and dressing supply provider:   [] ECF or Home Healthcare:  [x] Wound Assessment: [x] Physician or NP scheduled for Wound Assessment:   [] Return Appointment: With ***  in  `1 Week(s)  [] Ordered tests:      Electronically signed Gian Metcalf LPN on 5/05/2944 at 38:10 PM     Shaniqua Gaffney [address]: Should you experience any significant changes in your wound(s) or have questions about your wound care, please contact the [facility] Main at [address] [place] [address] 8:00 am - 4:30.   If you need help with your wound outside these hours and cannot wait until we are again available, contact your PCP or go to the hospital emergency room. PLEASE NOTE: IF YOU ARE UNABLE TO OBTAIN WOUND SUPPLIES, CONTINUE TO USE THE SUPPLIES YOU HAVE AVAILABLE UNTIL YOU ARE ABLE TO REACH US. IT IS MOST IMPORTANT TO KEEP THE WOUND COVERED AT ALL TIMES.      Physician Signature:_______________________    Date: ___________ Time:  ____________

## 2019-08-14 NOTE — WOUND CARE
08/14/19 1154 Wound Leg Left; Anterior Date First Assessed/Time First Assessed: 07/31/19 1400   Present on Hospital Admission: Yes  Primary Wound Type: Blister/bullae  Location: Leg  Wound Location Orientation: Left; Anterior Dressing Status Intact; Old drainage Dressing Type Absorptive; Unna boot Non-staged Wound Description Full thickness Condition of Base Pink Condition of Edges Closed Assessment Pink; Other (Comment) Tissue Type Percent Pink 100 Drainage Amount Scant Drainage Color Serous Wound Odor Musty Etta-wound Assessment Edema Cleansing and Cleansing Agents  Soap and water Dressing Changed Changed/New Dressing Type Applied Other (Comment) (coban, unna boot, proshield) Procedure Tolerated Well Wound Leg Lateral;Right Date First Assessed/Time First Assessed: 07/31/19 1400   Present on Hospital Admission: Yes  Primary Wound Type: Blister/bullae  Location: Leg  Wound Location Orientation: Lateral;Right Dressing Status Intact; Old drainage Dressing Type Absorptive; Unna boot Condition of Base Pink Condition of Edges Closed Assessment Drainage;Edema Drainage Amount Scant Drainage Color Serous Wound Odor Musty Etta-wound Assessment Edema Cleansing and Cleansing Agents  Dermal wound cleanser Dressing Changed Changed/New Dressing Type Applied Other (Comment) (coban) Procedure Tolerated Well

## 2019-08-21 ENCOUNTER — HOSPITAL ENCOUNTER (OUTPATIENT)
Dept: WOUND CARE | Age: 79
Discharge: HOME | End: 2019-08-21
Payer: MEDICARE

## 2019-08-21 VITALS
SYSTOLIC BLOOD PRESSURE: 125 MMHG | OXYGEN SATURATION: 99 % | HEART RATE: 85 BPM | DIASTOLIC BLOOD PRESSURE: 63 MMHG | RESPIRATION RATE: 18 BRPM | TEMPERATURE: 98.4 F

## 2019-08-21 PROCEDURE — 99214 OFFICE O/P EST MOD 30 MIN: CPT

## 2019-08-21 NOTE — DISCHARGE INSTRUCTIONS
Discharge Instructions for  Jodee Lugo  [address], [place], [facility]  [phone] Fax [phone]    NAME:  Anay Randall  YOB: 1940  MEDICAL RECORD NUMBER:  [id number]  DATE:  8/21/2019            Edema Control:  Apply: [x] Compression Stocking [x]Right Leg []Left Leg   [] Tubigrip []Right Leg Double Layer [x]Left Leg Double Layer        [x]High compression  20-30 mm/Hg   every morning immediately when getting up should be applied to affected leg(s) from mid foot to knee making sure to cover the heel. Remove every night before going to bed. [] Elevate leg(s) above the level of the heart when sitting. [] Avoid prolonged standing in one place. Dietary:  [x] Diet as tolerated       Activity:  [x] Activity as tolerated:               Return Appointment:    [x] Return Appointment: pt discharged from clinic        Electronically signed Arina Rubio RN on 8/21/2019 at [address] [place] [address]: Should you experience any significant changes in your wound(s) or have questions about your wound care, please contact the [facility] Main at [address] [place] [address] 8:00 am - 4:30. If you need help with your wound outside these hours and cannot wait until we are again available, contact your PCP or go to the hospital emergency room. PLEASE NOTE: IF YOU ARE UNABLE TO OBTAIN WOUND SUPPLIES, CONTINUE TO USE THE SUPPLIES YOU HAVE AVAILABLE UNTIL YOU ARE ABLE TO REACH US. IT IS MOST IMPORTANT TO KEEP THE WOUND COVERED AT ALL TIMES.      Physician Signature:_______________________    Date: ___________ Time:  ____________

## 2019-08-22 NOTE — WOUND CARE
[facility]  Follow up note. Chief Complaint:  Bryan Aleman is a 70 y.o.  male who presents with new open wound of his right leg      he has lymphedema for about 5 years. He has on and off venous ulcers. He was seen here previously for venous ulcer and it was healed after unna boots and compression stockings. He has not been compliant with wearing stockings and keeping his legs up. Review of systems  General: No fevers or chills. Cardiovascular: No chest pain or pressure. No palpitations. Pulmonary: No shortness of breath. Gastrointestinal: No nausea, vomiting. Derm: See above                Physical Exam:     Visit Vitals  /63 (BP 1 Location: Left arm, BP Patient Position: At rest;Sitting)   Pulse 85   Temp 98.4 °F (36.9 °C)   Resp 18   SpO2 99%     General: well developed, well nourished, pleasant , NAD. Hygiene good  Psych: cooperative. Pleasant. No anxiety or depression. Normal mood and affect. Neuro: alert and oriented to person/place/situation. Otherwise nonfocal.  Derm: Skin turgor normal for age, dry skin  HEENT: Normocephalic, atraumatic. EOMI. Conjunctiva clear. No scleral icterus. Chest: Good air entry bilaterally. Respirations non labored  Cardio[de-identified] Normal heart sounds,+ murmur  Abdomen: Soft, nontender, nondistended, normoactive bowel sounds  Lower extremities: color normal; temperature normal. Calves are supple, nontender. Focussed Lower Extremity Exam:  Vascular exam:  Bilateral lower extremity: moderate  edema, foot ,   DP pulse : Bilateral, 2+  PT pulse: Bilateral, 2+  Venous ulcer right leg. No open wounds  Etiology: venous stasis      Data Review:   No results found for this or any previous visit (from the past 24 hour(s)).     Assessment:     Patient Active Problem List   Diagnosis Code    Lymphedema I89.0    Chronic venous insufficiency I87.2    Intertrigo L30.4    [facility] I83.019, L97.919     70 y.o. male with bilateral venous ulcer of  right leg   Now healed    Needs :  Edema management    Plan:     Follow up as needed    Signed By: Elizabeth Fisher MD     August 22, 2019
